# Patient Record
Sex: MALE | Race: BLACK OR AFRICAN AMERICAN | Employment: OTHER | URBAN - METROPOLITAN AREA
[De-identification: names, ages, dates, MRNs, and addresses within clinical notes are randomized per-mention and may not be internally consistent; named-entity substitution may affect disease eponyms.]

---

## 2017-03-22 ENCOUNTER — OFFICE VISIT (OUTPATIENT)
Dept: PHARMACY | Facility: CLINIC | Age: 42
End: 2017-03-22

## 2017-03-22 ENCOUNTER — OFFICE VISIT (OUTPATIENT)
Dept: FAMILY MEDICINE | Facility: CLINIC | Age: 42
End: 2017-03-22

## 2017-03-22 VITALS
BODY MASS INDEX: 28.88 KG/M2 | WEIGHT: 163 LBS | HEART RATE: 85 BPM | OXYGEN SATURATION: 97 % | HEIGHT: 63 IN | TEMPERATURE: 97.5 F | DIASTOLIC BLOOD PRESSURE: 71 MMHG | SYSTOLIC BLOOD PRESSURE: 102 MMHG | RESPIRATION RATE: 20 BRPM

## 2017-03-22 DIAGNOSIS — F19.11 HISTORY OF SUBSTANCE ABUSE (H): ICD-10-CM

## 2017-03-22 DIAGNOSIS — F33.9 RECURRENT MAJOR DEPRESSIVE DISORDER, REMISSION STATUS UNSPECIFIED (H): ICD-10-CM

## 2017-03-22 DIAGNOSIS — F41.9 ANXIETY: ICD-10-CM

## 2017-03-22 DIAGNOSIS — F17.210 CIGARETTE NICOTINE DEPENDENCE WITHOUT COMPLICATION: ICD-10-CM

## 2017-03-22 DIAGNOSIS — Z76.89 ENCOUNTER TO ESTABLISH CARE: Primary | ICD-10-CM

## 2017-03-22 DIAGNOSIS — K04.7 DENTAL INFECTION: ICD-10-CM

## 2017-03-22 DIAGNOSIS — F33.9 RECURRENT MAJOR DEPRESSIVE DISORDER, REMISSION STATUS UNSPECIFIED (H): Primary | ICD-10-CM

## 2017-03-22 RX ORDER — QUETIAPINE FUMARATE 400 MG/1
400 TABLET, FILM COATED ORAL
COMMUNITY
Start: 2017-03-14 | End: 2018-10-24

## 2017-03-22 RX ORDER — PROPRANOLOL HYDROCHLORIDE 80 MG/1
80 CAPSULE, EXTENDED RELEASE ORAL
COMMUNITY
Start: 2017-03-14 | End: 2018-08-21

## 2017-03-22 RX ORDER — ATOMOXETINE 100 MG/1
100 CAPSULE ORAL
COMMUNITY
Start: 2017-03-14 | End: 2017-04-20

## 2017-03-22 RX ORDER — TAMSULOSIN HYDROCHLORIDE 0.4 MG/1
0.4 CAPSULE ORAL
COMMUNITY
Start: 2017-03-14 | End: 2017-04-20

## 2017-03-22 RX ORDER — DIPHENHYDRAMINE HCL 25 MG
25-50 TABLET ORAL
COMMUNITY
Start: 2017-03-14 | End: 2017-04-14

## 2017-03-22 RX ORDER — QUETIAPINE FUMARATE 100 MG/1
100 TABLET, FILM COATED ORAL 2 TIMES DAILY
COMMUNITY
Start: 2017-03-14 | End: 2018-08-21

## 2017-03-22 RX ORDER — CLINDAMYCIN HCL 300 MG
300 CAPSULE ORAL 3 TIMES DAILY
Qty: 30 CAPSULE | Refills: 0 | Status: SHIPPED | OUTPATIENT
Start: 2017-03-22 | End: 2017-04-01

## 2017-03-22 RX ORDER — ALBUTEROL SULFATE 90 UG/1
1-2 AEROSOL, METERED RESPIRATORY (INHALATION)
COMMUNITY
Start: 2017-03-14 | End: 2017-04-14

## 2017-03-22 RX ORDER — PROPRANOLOL HYDROCHLORIDE 20 MG/1
20 TABLET ORAL
COMMUNITY
Start: 2017-03-14 | End: 2018-09-11

## 2017-03-22 RX ORDER — ATOMOXETINE 60 MG/1
100 CAPSULE ORAL
COMMUNITY
Start: 2016-05-14 | End: 2017-03-22

## 2017-03-22 RX ORDER — VITAMIN A ACETATE, BETA CAROTENE, ASCORBIC ACID, CHOLECALCIFEROL, .ALPHA.-TOCOPHEROL ACETATE, DL-, THIAMINE MONONITRATE, RIBOFLAVIN, NIACINAMIDE, PYRIDOXINE HYDROCHLORIDE, FOLIC ACID, CYANOCOBALAMIN, CALCIUM CARBONATE, FERROUS FUMARATE, ZINC OXIDE, CUPRIC OXIDE 3080; 12; 120; 400; 1; 1.84; 3; 20; 22; 920; 25; 200; 27; 10; 2 [IU]/1; UG/1; MG/1; [IU]/1; MG/1; MG/1; MG/1; MG/1; MG/1; [IU]/1; MG/1; MG/1; MG/1; MG/1; MG/1
TABLET, FILM COATED ORAL
COMMUNITY
Start: 2017-03-14 | End: 2017-05-19

## 2017-03-22 ASSESSMENT — ENCOUNTER SYMPTOMS
RESPIRATORY NEGATIVE: 1
DYSPHORIC MOOD: 1
CARDIOVASCULAR NEGATIVE: 1
NERVOUS/ANXIOUS: 1
CONSTITUTIONAL NEGATIVE: 1

## 2017-03-22 ASSESSMENT — ANXIETY QUESTIONNAIRES
7. FEELING AFRAID AS IF SOMETHING AWFUL MIGHT HAPPEN: SEVERAL DAYS
2. NOT BEING ABLE TO STOP OR CONTROL WORRYING: SEVERAL DAYS
3. WORRYING TOO MUCH ABOUT DIFFERENT THINGS: NEARLY EVERY DAY
5. BEING SO RESTLESS THAT IT IS HARD TO SIT STILL: NEARLY EVERY DAY
1. FEELING NERVOUS, ANXIOUS, OR ON EDGE: NEARLY EVERY DAY
IF YOU CHECKED OFF ANY PROBLEMS ON THIS QUESTIONNAIRE, HOW DIFFICULT HAVE THESE PROBLEMS MADE IT FOR YOU TO DO YOUR WORK, TAKE CARE OF THINGS AT HOME, OR GET ALONG WITH OTHER PEOPLE: VERY DIFFICULT

## 2017-03-22 ASSESSMENT — PATIENT HEALTH QUESTIONNAIRE - PHQ9: 5. POOR APPETITE OR OVEREATING: MORE THAN HALF THE DAYS

## 2017-03-22 NOTE — PROGRESS NOTES
Pharmacy Progress Note: Transitions of Care     History of Hospitalization     Paul Godfrey was referred by Eva Henley CNP    for transitional care and medication management following their recent hospitalization.       HISTORY OF Hospitalization    Reason for hospitalization: Muscle cramping and dehyration   Date of ADMIT:    Date of DISCHARGE ~3/13/17   Other hospitalizations in past year:      Hospital problem list/ specific issues to address:  1.     Suicidal ideation   2.     Drug induced Rhabdomyolysis       SUBJECTIVE     Patient is in clinic today for hospital fu and establish care with Eva Henley CNP     Depakote- want the clinic to know that he had a bad reaction to Depakote and will refuse this medication if it is offered.    Aspirin allergy  Has been on the current med list for the past 3 weeks.    Would like to restart old meds. Concerned about anxiety.  Wants to start old medications.       Propranolol 20 mg - has not been working for anxiety.   Left hospital: and was dc'ed meds  zoloft  wellbutrin  trazadone  seroquel    Muscle cramps were happening before the hospitalization.    Does not have a psychiatrist currently.    Need a [facility]. - ángel LOPEZ  Psychiatrist in [facility].        ASTHMA:  Needs sports induced asthma  Controlled with ventolin       OBJECTIVE  Creatinine   Date Value Ref Range Status   08/13/2013 1.15 0.80 - 1.30 mg/dL Final   ]    Liver Function Studies -   Recent Labs   Lab Test  08/13/13   1203   PROTTOTAL  7.9   ALBUMIN  4.0   BILITOTAL  0.2   ALKPHOS  110   AST  25   ALT  39       ASSESSMENT     Drug Therapy Problems  Patient s recent hospitalization was likely/not likely related to medication use.      1)  Medication list  ;     DTP degree:1               Time today was spent discussing medications that he was formerly on and those that he would like to restart.  At this time we have updated the medication list as we can seen in  CAREeverywhere.       Plan is to establish mental health care and then consider changing therapy at that time.        Health literacy           General: Low/Moderate/High/Unknown  Comment: moderate     PLAN     Outpatient medication list has been updated to reflects patient s current medication use.       1. Follow up with Pyschiatry           Follow up by:  In clinic     MTM and medication reconciliation have been completed by pharmacy. Medication assessment and plan address patient s specific concerns and goals.   Eva Henley CNP  was provided our recommendations in clinic today  before/after they saw patient and  was available for supervision during the visit and is the authorizing prescriber for this visit through the pharmacist collaborative practice agreement.     Thank you for the opportunity to participate in the care of this patient.  Ian Cox, Pharm.D.  Appointment length: 20 min

## 2017-03-22 NOTE — MR AVS SNAPSHOT
After Visit Summary   3/22/2017    Paul Godfrey    MRN: [id number]           Patient Information     Date Of Birth          1975        Visit Information        Provider Department      3/22/2017 9:00 AM [facility]        Today's Diagnoses     Recurrent major depressive disorder, remission status unspecified (H)    -  1       Follow-ups after your visit        Who to contact     Please call your clinic at [phone] to:    Ask questions about your health    Make or cancel appointments    Discuss your medicines    Learn about your test results    Speak to your doctor   If you have compliments or concerns about an experience at your clinic, or if you wish to file a complaint, please contact [facility] Patient Relations at [phone] or email us at [email or web address]         Additional Information About Your Visit        Care EveryWhere ID     This is your Care EveryWhere ID. This could be used by other organizations to access your [facility] medical records  [id number]         Blood Pressure from Last 3 Encounters:   03/22/17 102/71   08/13/13 112/76    Weight from Last 3 Encounters:   03/22/17 163 lb (73.9 kg)   08/13/13 143 lb (64.9 kg)              Today, you had the following     No orders found for display         Today's Medication Changes          These changes are accurate as of: 3/22/17 11:59 PM.  If you have any questions, ask your nurse or doctor.               Start taking these medicines.        Dose/Directions    clindamycin 300 MG capsule   Commonly known as:  CLEOCIN   Used for:  Dental infection   Started by:  Eva Henley APRN CNP        Dose:  300 mg   Take 1 capsule (300 mg) by mouth 3 times daily for 10 days   Quantity:  30 capsule   Refills:  0         These medicines have changed or have updated prescriptions.        Dose/Directions    atomoxetine 100 MG capsule   Commonly known  as:  STRATTERA   This may have changed:  Another medication with the same name was removed. Continue taking this medication, and follow the directions you see here.   Changed by:  Eva Henley APRN CNP        Dose:  100 mg   Take 100 mg by mouth   Refills:  0         Stop taking these medicines if you haven't already. Please contact your care team if you have questions.     ADDERALL 30 MG per tablet   Generic drug:  amphetamine-dextroamphetamine   Stopped by:  Eva Henley APRN CNP           LORazepam 2 MG tablet   Commonly known as:  ATIVAN   Stopped by:  Eva Henley APRN CNP           olopatadine 0.1 % ophthalmic solution   Commonly known as:  PATANOL   Stopped by:  Eva Henley APRN CNP           REMERON PO   Stopped by:  Eva Henley APRN CNP           traZODone 50 MG tablet   Commonly known as:  DESYREL   Stopped by:  Eva Henley APRN CNP           ZOLOFT PO   Stopped by:  Eva Henley APRN CNP                Where to get your medicines      These medications were sent to [facility] [place], [place] - [address] [facility]  [address] [place] [address]     Phone:  [phone]     clindamycin 300 MG capsule                Primary Care Provider Office Phone # Fax #    TARAH Donaldson [phone]       [facility] [address] [facility] [address]        Thank you!     Thank you for choosing [facility]  for your care. Our goal is always to provide you with excellent care. Hearing back from our patients is one way we can continue to improve our services. Please take a few minutes to complete the written survey that you may receive in the mail after your visit with us. Thank you!             Your Updated Medication List - Protect others around you: Learn how to safely use, store and throw away your medicines at www.disposemymeds.org.          This list is accurate as of: 3/22/17  11:59 PM.  Always use your most recent med list.                   Brand Name Dispense Instructions for use    acetaminophen 325 MG Suppository    TYLENOL     Place 325 mg rectally every 4 hours as needed for mild pain       albuterol 108 (90 BASE) MCG/ACT Inhaler    PROAIR HFA/PROVENTIL HFA/VENTOLIN HFA     Inhale 1-2 puffs into the lungs       atomoxetine 100 MG capsule    STRATTERA     Take 100 mg by mouth       clindamycin 300 MG capsule    CLEOCIN    30 capsule    Take 1 capsule (300 mg) by mouth 3 times daily for 10 days       diphenhydrAMINE 25 MG tablet    BENADRYL     Take 25-50 mg by mouth       PRENATAL PLUS 27-1 MG Tabs          * propranolol 20 MG tablet    INDERAL     Take 20 mg by mouth       * propranolol 80 MG 24 hr capsule    INDERAL LA     Take 80 mg by mouth       * QUEtiapine 100 MG tablet    SEROquel     Take 100 mg by mouth       * QUEtiapine 400 MG tablet    SEROquel     Take 400 mg by mouth       tamsulosin 0.4 MG capsule    FLOMAX     Take 0.4 mg by mouth       * Notice:  This list has 4 medication(s) that are the same as other medications prescribed for you. Read the directions carefully, and ask your doctor or other care provider to review them with you.

## 2017-03-22 NOTE — MR AVS SNAPSHOT
After Visit Summary   3/22/2017    Paul Godfrey    MRN: [id number]           Patient Information     Date Of Birth          1975        Visit Information        Provider Department      3/22/2017 9:20 AM Eva Henley APRN CNP [facility]        Today's Diagnoses     Anxiety    -  1    Recurrent major depressive disorder, remission status unspecified (H)        Dental infection          Care Instructions    [facility]   Address: [address], [place] [address]  Phone: [phone]        [facility]  Address: [address], [place] [address]  Phone: [phone]      Here is the plan from today's visit    1. Anxiety    - BEHAVIORAL HEALTH REFERRAL ([facility] interal and external)  - MENTAL HEALTH REFERRAL    2. Recurrent major depressive disorder, remission status unspecified (H)    - MENTAL HEALTH REFERRAL    3. Dental infection    - clindamycin (CLEOCIN) 300 MG capsule; Take 1 capsule (300 mg) by mouth 3 times daily for 10 days  Dispense: 30 capsule; Refill: 0    Follow-up in clinic after psychiatry appointment, sooner as needed.       Thank you for coming to [facility] today.  Lab Testing:  **If you had lab testing today and your results are reassuring or normal they will be mailed to you or sent through [employer] within 7 days.   **If the lab tests need quick action we will call you with the results.  The phone number we will call with results is # [phone] (home) . If this is not the best number please call our clinic and change the number.  Medication Refills:  If you need any refills please call your pharmacy and they will contact us.   If you need to  your refill at a new pharmacy, please contact the new pharmacy directly. The new pharmacy will help you get your medications transferred faster.   Scheduling:  If you have any concerns about today's visit or wish to  schedule another appointment please call our office during normal business hours [phone] (8-5:00 M-F)  If a referral was made to a [facility] and you don't get a call from central scheduling please call [phone].  If a Mammogram was ordered for you at [facility] call [phone] to schedule or change your appointment.  If you had an XRay/CT/Ultrasound/MRI ordered the number is [phone] to schedule or change your radiology appointment.   Medical Concerns:  If you have urgent medical concerns please call [phone] at any time of the day.  If you have a medical emergency please call [phone].          Follow-ups after your visit        Additional Services     BEHAVIORAL HEALTH REFERRAL ([facility] inter[facility] and external)       Referring MD: THU HOUSTON    May leave message on voicemail: Yes  PHQ-9 Score: 12  GAD7 Score: 16            MENTAL HEALTH REFERRAL       Your provider has referred you to:     [facility]  Psychiatry evaluation and further management    All scheduling is subject to the client's specific insurance plan & benefits, provider/location availability, and provider clinical specialities.  Please arrive 15 minutes early for your first appointment and bring your completed paperwork.    Please be aware that coverage of these services is subject to the terms and limitations of your health insurance plan.  Call member services at your health plan with any benefit or coverage questions.                  Who to contact     Please call your clinic at [phone] to:    Ask questions about your health    Make or cancel appointments    Discuss your medicines    Learn about your test results    Speak to your doctor   If you have compliments or concerns about an experience at your clinic, or if you wish to file a complaint, please contact [facility] Patient Relations at [phone] or email us at [email or web address]         Additional  "Information About Your Visit        Care EveryWhere ID     This is your Care EveryWhere ID. This could be used by other organizations to access your [facility] medical records  [id number]        Your Vitals Were     Pulse Temperature Respirations Height Pulse Oximetry BMI (Body Mass Index)    85 97.5  F (36.4  C) (Oral) 20 5' 3\" (160 cm) 97% 28.87 kg/m2       Blood Pressure from Last 3 Encounters:   03/22/17 102/71   08/13/13 112/76    Weight from Last 3 Encounters:   03/22/17 163 lb (73.9 kg)   08/13/13 143 lb (64.9 kg)              We Performed the Following     BEHAVIORAL HEALTH REFERRAL ([facility] interal and external)     MENTAL HEALTH REFERRAL          Today's Medication Changes          These changes are accurate as of: 3/22/17 10:30 AM.  If you have any questions, ask your nurse or doctor.               Start taking these medicines.        Dose/Directions    clindamycin 300 MG capsule   Commonly known as:  CLEOCIN   Used for:  Dental infection   Started by:  Eva Henley APRN CNP        Dose:  300 mg   Take 1 capsule (300 mg) by mouth 3 times daily for 10 days   Quantity:  30 capsule   Refills:  0         These medicines have changed or have updated prescriptions.        Dose/Directions    atomoxetine 100 MG capsule   Commonly known as:  STRATTERA   This may have changed:  Another medication with the same name was removed. Continue taking this medication, and follow the directions you see here.   Changed by:  Eva Henley APRN CNP        Dose:  100 mg   Take 100 mg by mouth   Refills:  0         Stop taking these medicines if you haven't already. Please contact your care team if you have questions.     ADDERALL 30 MG per tablet   Generic drug:  amphetamine-dextroamphetamine   Stopped by:  Eva Henley APRN CNP           LORazepam 2 MG tablet   Commonly known as:  ATIVAN   Stopped by:  Eva Henley APRN CNP           olopatadine 0.1 % ophthalmic solution   Commonly known as: "  PATANOL   Stopped by:  Eva Henley APRN CNP           REMERON PO   Stopped by:  Eva Henley APRN CNP           traZODone 50 MG tablet   Commonly known as:  DESYREL   Stopped by:  Eva Henley APRN CNP           ZOLOFT PO   Stopped by:  Eva Henley APRN CNP                Where to get your medicines      These medications were sent to [facility] [place], [place] - [address] [facility] [address]     Phone:  [phone]     clindamycin 300 MG capsule                Primary Care Provider    Provider Unknown       No address on file        Thank you!     Thank you for choosing [facility]  for your care. Our goal is always to provide you with excellent care. Hearing back from our patients is one way we can continue to improve our services. Please take a few minutes to complete the written survey that you may receive in the mail after your visit with us. Thank you!             Your Updated Medication List - Protect others around you: Learn how to safely use, store and throw away your medicines at www.disposemymeds.org.          This list is accurate as of: 3/22/17 10:30 AM.  Always use your most recent med list.                   Brand Name Dispense Instructions for use    acetaminophen 325 MG Suppository    TYLENOL     Place 325 mg rectally every 4 hours as needed for mild pain       albuterol 108 (90 BASE) MCG/ACT Inhaler    PROAIR HFA/PROVENTIL HFA/VENTOLIN HFA     Inhale 1-2 puffs into the lungs       atomoxetine 100 MG capsule    STRATTERA     Take 100 mg by mouth       clindamycin 300 MG capsule    CLEOCIN    30 capsule    Take 1 capsule (300 mg) by mouth 3 times daily for 10 days       diphenhydrAMINE 25 MG tablet    BENADRYL     Take 25-50 mg by mouth       PRENATAL PLUS 27-1 MG Tabs          * propranolol 20 MG tablet    INDERAL     Take 20 mg by mouth       * propranolol 80 MG 24 hr capsule    INDERAL LA     Take  80 mg by mouth       * QUEtiapine 100 MG tablet    SEROquel     Take 100 mg by mouth       * QUEtiapine 400 MG tablet    SEROquel     Take 400 mg by mouth       tamsulosin 0.4 MG capsule    FLOMAX     Take 0.4 mg by mouth       * Notice:  This list has 4 medication(s) that are the same as other medications prescribed for you. Read the directions carefully, and ask your doctor or other care provider to review them with you.

## 2017-03-22 NOTE — PATIENT INSTRUCTIONS
[facility]   Address: [address], [place] [address]  Phone: [phone]        [facility]  Address: [address], [place] [address]  Phone: [phone]      Here is the plan from today's visit    1. Anxiety    - BEHAVIORAL HEALTH REFERRAL ([facility] interal and external)  - MENTAL HEALTH REFERRAL    2. Recurrent major depressive disorder, remission status unspecified (H)    - MENTAL HEALTH REFERRAL    3. Dental infection    - clindamycin (CLEOCIN) 300 MG capsule; Take 1 capsule (300 mg) by mouth 3 times daily for 10 days  Dispense: 30 capsule; Refill: 0    Follow-up in clinic after psychiatry appointment, sooner as needed.       Thank you for coming to [facility] today.  Lab Testing:  **If you had lab testing today and your results are reassuring or normal they will be mailed to you or sent through [email or web address] within 7 days.   **If the lab tests need quick action we will call you with the results.  The phone number we will call with results is # [phone] (home) . If this is not the best number please call our clinic and change the number.  Medication Refills:  If you need any refills please call your pharmacy and they will contact us.   If you need to  your refill at a new pharmacy, please contact the new pharmacy directly. The new pharmacy will help you get your medications transferred faster.   Scheduling:  If you have any concerns about today's visit or wish to schedule another appointment please call our office during normal business hours [phone] (8-5:00 M-F)  If a referral was made to a [facility] and you don't get a call from central scheduling please call [phone].  If a Mammogram was ordered for you at [facility] call [phone] to schedule or change your appointment.  If you had an XRay/CT/Ultrasound/MRI ordered the number is [phone] to schedule or change your  radiology appointment.   Medical Concerns:  If you have urgent medical concerns please call [phone] at any time of the day.  If you have a medical emergency please call [phone].

## 2017-03-22 NOTE — PROGRESS NOTES
HPI:       Paul Godfrey is a 41 year old who presents for the following  Patient presents with:  Hospital F/U: Discharged due to bilateral leg spasm signed CE for [facility], [facility] and [facility]  Referral: Mental Health, and dentist for teeth issues  Establish Care  Other: Boil near upper pelvic area noticed x1 week - painful and would like removed today if possible.     Patient reports being hospitalized for muscle cramps, dehydration on 2/17/17 and then was transferred to behavioral unit at [facility] in [place].  Was discharged from there on 3/13/17.  Is currently at [facility], this allowed him to get away from previous unhealthy relationship.  Was accepted into training program.      Care everywhere was obtained.   Upon review of chart.  Patient was hospitalized for suicidal ideation, IDU with methamphetamine, chronic shoulder pain with concern form NMS vs. Drug induced disorder. +rhabdomyolysis from meth use, hypothyroidism - needed outpatient follow-up.  UDS was positive for meth and THC.      Had a subsequent inpatient psychiatry stay at [facility].          Previous psychiatrist was in [place], was seeing via skype - Dr. Amalia Abdi. This referral was done by [facility].      Needs dental referral.    Dental concerns - nerve exposure.    Left lower molars are painful.    Last dental visit was a couple years ago.   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     Previously was living in [place].  Is originally from [place].    , has 5 children.       Past medical history:  MDD, Anxiety, ADHD, social anxiety.     Alcohol:  None  Denies recent illicit drug use.   Tobacco:  3 cigarettes daily      Problem, Medication and Allergy Lists were reviewed and are current.  Patient is an established patient of this clinic.         Review of Systems:   Review of Systems   Constitutional: Negative.    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    Respiratory: Negative.         History of exercise induced asthma     Cardiovascular: Negative.    Genitourinary: Positive for urgency (from Flomax).   Psychiatric/Behavioral: Positive for dysphoric mood. Negative for suicidal ideas. The patient is nervous/anxious.         See HPI             Physical Exam:   Patient Vitals for the past 24 hrs:   BP Temp Temp src Pulse Resp SpO2 Height Weight   03/22/17 0933 102/71 97.5  F (36.4  C) Oral 85 20 97 % 5' 3\" (160 cm) 163 lb (73.9 kg)     Body mass index is 28.87 kg/(m^2).  Vitals were reviewed and were normal       Physical Exam   Constitutional: He is oriented to person, place, and time. He appears well-developed and well-nourished. No distress.   HENT:   Mouth/Throat:       Cardiovascular: Normal rate and regular rhythm.    Pulmonary/Chest: Effort normal and breath sounds normal. No respiratory distress. He has no wheezes.   Neurological: He is alert and oriented to person, place, and time.   Psychiatric: His behavior is normal. Judgment and thought content normal. His mood appears anxious. Cognition and memory are normal.   Speech is slightly pressured       Assessment and Plan     Paul was seen today for hospital f/u, referral and establish care.    Diagnoses and all orders for this visit:    Encounter to establish care  Care everywhere obtained.     Anxiety  -     BEHAVIORAL HEALTH REFERRAL ([facility] interal and external)  -     MENTAL HEALTH REFERRAL - psychiatry referral to [facility] - patient given phone number and address    Recurrent major depressive disorder, remission status unspecified (H)  -     MENTAL HEALTH REFERRAL    Need DHEERAJ from previous psychiatrist. Obtain at follow-up visit.     Dental infection  -     clindamycin (CLEOCIN) 300 MG capsule; Take 1 capsule (300 mg) by mouth 3 times daily for 10 days  -      Patient given phone number and address for [facility].      History of " substance abuse  Briefly discussed sobriety at today's visit.    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     Cigarette nicotine dependence without complication      Follow-up in clinic within one month - plan recheck of TSH at follow-up visit due to hypothyroidism during last hospitalization.   Encouraged CPE/further preventative care to be addressed.          Options for treatment and follow-up care were reviewed with the patient. Paul Godfrey  engaged in the decision making process and verbalized understanding of the options discussed and agreed with the final plan.    Eva Henley, TARAH CNP

## 2017-03-23 ENCOUNTER — TELEPHONE (OUTPATIENT)
Dept: FAMILY MEDICINE | Facility: CLINIC | Age: 42
End: 2017-03-23

## 2017-03-23 ASSESSMENT — PATIENT HEALTH QUESTIONNAIRE - PHQ9: SUM OF ALL RESPONSES TO PHQ QUESTIONS 1-9: 12

## 2017-03-23 NOTE — LETTER
Dear Paul Godfrey    You were recently in to see a physician here at [facility]. At that time, your doctor put in a referral for mental health counseling.  I have tried to reach you with information on possible places for you to receive these services, but I have been unable to reach you.  For that reason, I am sending this letter with some possible places listed below.  We provide you with more than one possibility, so that you can contact them to identify who might best meet your needs in terms of location and hours available for services.  You are always able to call your insurance company to identify locations that would work for you as well.  Please feel free to call me at the phone number below if you have any questions.    Sincerely,      [facility]      Mental Health Referral  Please Refer out to:     [facility] (adults)  [address] [place], [place]   [phone]  (other locations available, can call main #)

## 2017-03-23 NOTE — TELEPHONE ENCOUNTER
Patient was referred to [facility] for psychiatry, would suggest that he establish with a therapist there as well to help consolidate and coordinate treatment.    Mental Health Referral  Please Refer out to:    [facility] (adults)  [address] [place], [place]    [phone]  (other locations available, can call main #)

## 2017-04-10 ENCOUNTER — TELEPHONE (OUTPATIENT)
Dept: FAMILY MEDICINE | Facility: CLINIC | Age: 42
End: 2017-04-10

## 2017-04-10 DIAGNOSIS — J30.2 SEASONAL ALLERGIC RHINITIS, UNSPECIFIED ALLERGIC RHINITIS TRIGGER: Primary | ICD-10-CM

## 2017-04-10 RX ORDER — LORATADINE 10 MG/1
10 TABLET ORAL DAILY
Qty: 30 TABLET | Refills: 11 | Status: SHIPPED | OUTPATIENT
Start: 2017-04-10 | End: 2017-04-20

## 2017-04-10 NOTE — TELEPHONE ENCOUNTER
Prescription sent to patient's pharmacy for Loratadine, 10 mg once daily.  Patient to follow-up in clinic with no improvement or worsening of symptoms. - FREDDYeiluana, CNP

## 2017-04-10 NOTE — TELEPHONE ENCOUNTER
[facility] phone call message-patient reporting a symptom:     Symptom: Sneezing, runny nose, itchy, watery, and red eyes. Patient is requesting a prescription for loratadine to be sent to [facility] [phone].    Same Day Visit Offered: None available at time of call.    Additional comments:     OK to leave message on voice mail? Yes    Primary language: English      needed? No    Call taken on April 10, 2017 at 12:29 PM by Tommy Holly

## 2017-04-14 DIAGNOSIS — J45.20 INTERMITTENT ASTHMA, UNCOMPLICATED: Primary | ICD-10-CM

## 2017-04-14 RX ORDER — PROPRANOLOL HYDROCHLORIDE 80 MG/1
80 CAPSULE, EXTENDED RELEASE ORAL
Qty: 30 CAPSULE | Status: CANCELLED | OUTPATIENT
Start: 2017-04-14

## 2017-04-14 RX ORDER — DIPHENHYDRAMINE HCL 25 MG
25-50 TABLET ORAL EVERY 6 HOURS PRN
Qty: 56 TABLET | Refills: 1 | Status: SHIPPED | OUTPATIENT
Start: 2017-04-14 | End: 2018-09-11

## 2017-04-14 RX ORDER — TRAZODONE HYDROCHLORIDE 50 MG/1
TABLET, FILM COATED ORAL AT BEDTIME
Qty: 90 TABLET | Status: CANCELLED | OUTPATIENT
Start: 2017-04-14

## 2017-04-14 RX ORDER — VITAMIN A ACETATE, BETA CAROTENE, ASCORBIC ACID, CHOLECALCIFEROL, .ALPHA.-TOCOPHEROL ACETATE, DL-, THIAMINE MONONITRATE, RIBOFLAVIN, NIACINAMIDE, PYRIDOXINE HYDROCHLORIDE, FOLIC ACID, CYANOCOBALAMIN, CALCIUM CARBONATE, FERROUS FUMARATE, ZINC OXIDE, CUPRIC OXIDE 3080; 12; 120; 400; 1; 1.84; 3; 20; 22; 920; 25; 200; 27; 10; 2 [IU]/1; UG/1; MG/1; [IU]/1; MG/1; MG/1; MG/1; MG/1; MG/1; [IU]/1; MG/1; MG/1; MG/1; MG/1; MG/1
TABLET, FILM COATED ORAL
Qty: 30 TABLET | Status: CANCELLED | OUTPATIENT
Start: 2017-04-14

## 2017-04-14 RX ORDER — TAMSULOSIN HYDROCHLORIDE 0.4 MG/1
0.4 CAPSULE ORAL
Qty: 60 CAPSULE | Status: CANCELLED | OUTPATIENT
Start: 2017-04-14

## 2017-04-14 RX ORDER — ALBUTEROL SULFATE 90 UG/1
2 AEROSOL, METERED RESPIRATORY (INHALATION) EVERY 4 HOURS PRN
Qty: 1 INHALER | Refills: 1 | Status: SHIPPED | OUTPATIENT
Start: 2017-04-14 | End: 2018-09-11

## 2017-04-14 NOTE — TELEPHONE ENCOUNTER
Date of last visit at clinic: 3/22/17    Please complete refill and CLOSE ENCOUNTER.  Closing the encounter signifies the refill is complete.    Sarah Frank RN

## 2017-04-14 NOTE — TELEPHONE ENCOUNTER
Message routed to . Please schedule appointment in clinic for rest of refills per PCP.    Sarah Frank RN

## 2017-04-14 NOTE — TELEPHONE ENCOUNTER
Called patient to schedule appointment. Mobile number no longer in service. Called home number; no answer, left voicemail.

## 2017-04-14 NOTE — TELEPHONE ENCOUNTER
Patient was a new patient in March 2017 with recommendations for follow-up in 1 months.  Prescription refills given for Albuterol and Benadryl.  Patient needs clinic visit to address other refills.  - Katerina CNP

## 2017-04-14 NOTE — TELEPHONE ENCOUNTER
[facility] phone call message- patient requesting a refill:    Full Medication Name: Inderol 80mg 1capsule daily, Prenatal vitamin 27mg 1x daily, Tamulosin 0.4 mg cap 1xa day, ventolin 90mcg, Benadryl 25mg 1x daily, Trazadone 50mg 1-2 tabs at bedtime.    Dose: see above and in chart    Pharmacy confirmed as   [facility] [place] [address] [place] [address]  Phone: [phone] Fax: [phone]  : Yes    Additional Comments: Please refill today if possible he is out of medications.      OK to leave a message on voice mail? Yes    Primary language: English      needed? No    Call taken on April 14, 2017 at 9:02 AM by Kylee Torre

## 2017-04-20 ENCOUNTER — OFFICE VISIT (OUTPATIENT)
Dept: FAMILY MEDICINE | Facility: CLINIC | Age: 42
End: 2017-04-20

## 2017-04-20 VITALS
RESPIRATION RATE: 16 BRPM | OXYGEN SATURATION: 95 % | DIASTOLIC BLOOD PRESSURE: 74 MMHG | SYSTOLIC BLOOD PRESSURE: 112 MMHG | BODY MASS INDEX: 30.65 KG/M2 | TEMPERATURE: 98.1 F | HEART RATE: 92 BPM | WEIGHT: 173 LBS

## 2017-04-20 DIAGNOSIS — J30.2 SEASONAL ALLERGIC RHINITIS, UNSPECIFIED ALLERGIC RHINITIS TRIGGER: Primary | ICD-10-CM

## 2017-04-20 DIAGNOSIS — H10.13 ALLERGIC CONJUNCTIVITIS, BILATERAL: ICD-10-CM

## 2017-04-20 DIAGNOSIS — Z86.39 HISTORY OF HYPOTHYROIDISM: ICD-10-CM

## 2017-04-20 LAB — TSH SERPL DL<=0.005 MIU/L-ACNC: 2.51 MU/L (ref 0.4–4)

## 2017-04-20 RX ORDER — LISDEXAMFETAMINE DIMESYLATE 20 MG/1
20 CAPSULE ORAL EVERY MORNING
Qty: 30 CAPSULE | Refills: 0 | COMMUNITY
Start: 2017-04-20 | End: 2018-08-21

## 2017-04-20 RX ORDER — LORATADINE 10 MG/1
10 TABLET ORAL DAILY
Qty: 30 TABLET | Refills: 11 | Status: SHIPPED | OUTPATIENT
Start: 2017-04-20 | End: 2018-09-11

## 2017-04-20 RX ORDER — FLUTICASONE PROPIONATE 50 MCG
2 SPRAY, SUSPENSION (ML) NASAL DAILY
Qty: 1 BOTTLE | Refills: 11 | Status: SHIPPED | OUTPATIENT
Start: 2017-04-20 | End: 2018-09-11

## 2017-04-20 ASSESSMENT — ANXIETY QUESTIONNAIRES
1. FEELING NERVOUS, ANXIOUS, OR ON EDGE: SEVERAL DAYS
6. BECOMING EASILY ANNOYED OR IRRITABLE: SEVERAL DAYS
GAD7 TOTAL SCORE: 6
IF YOU CHECKED OFF ANY PROBLEMS ON THIS QUESTIONNAIRE, HOW DIFFICULT HAVE THESE PROBLEMS MADE IT FOR YOU TO DO YOUR WORK, TAKE CARE OF THINGS AT HOME, OR GET ALONG WITH OTHER PEOPLE: SOMEWHAT DIFFICULT
3. WORRYING TOO MUCH ABOUT DIFFERENT THINGS: SEVERAL DAYS
7. FEELING AFRAID AS IF SOMETHING AWFUL MIGHT HAPPEN: NOT AT ALL
5. BEING SO RESTLESS THAT IT IS HARD TO SIT STILL: MORE THAN HALF THE DAYS
2. NOT BEING ABLE TO STOP OR CONTROL WORRYING: NOT AT ALL

## 2017-04-20 ASSESSMENT — PATIENT HEALTH QUESTIONNAIRE - PHQ9: 5. POOR APPETITE OR OVEREATING: SEVERAL DAYS

## 2017-04-20 ASSESSMENT — ENCOUNTER SYMPTOMS
EYE REDNESS: 1
RESPIRATORY NEGATIVE: 1
CONSTITUTIONAL NEGATIVE: 1
CARDIOVASCULAR NEGATIVE: 1
EYE ITCHING: 1

## 2017-04-20 NOTE — MR AVS SNAPSHOT
After Visit Summary   4/20/2017    Paul Godfrey    MRN: [id number]           Patient Information     Date Of Birth          1975        Visit Information        Provider Department      4/20/2017 9:20 AM Eva Henley APRN CNP [facility]        Today's Diagnoses     Attention deficit hyperactivity disorder (ADHD), unspecified ADHD type    -  1    Seasonal allergic rhinitis, unspecified allergic rhinitis trigger        Allergic conjunctivitis, bilateral        History of hypothyroidism          Care Instructions    Here is the plan from today's visit    1. Seasonal allergic rhinitis, unspecified allergic rhinitis trigger  - loratadine (CLARITIN) 10 MG tablet; Take 1 tablet (10 mg) by mouth daily  Dispense: 30 tablet; Refill: 11  - fluticasone (FLONASE) 50 MCG/ACT spray; Spray 2 sprays into both nostrils daily  Dispense: 1 Bottle; Refill: 11    2. Attention deficit hyperactivity disorder (ADHD), unspecified ADHD type  - lisdexamfetamine (VYVANSE) 20 MG capsule; Take 1 capsule (20 mg) by mouth every morning  Dispense: 30 capsule; Refill: 0  Updated medication list    3. Allergic conjunctivitis, bilateral  - ketotifen (ZADITOR) 0.025 % SOLN ophthalmic solution; Place 1 drop into both eyes every 12 hours  Dispense: 1 Bottle; Refill: 6    4. History of hypothyroidism  - TSH with free T4 reflex      Follow-up with no improvement or worsening of symptoms.     Thank you for coming to [facility] today.  Lab Testing:  **If you had lab testing today and your results are reassuring or normal they will be mailed to you or sent through [employer] within 7 days.   **If the lab tests need quick action we will call you with the results.  The phone number we will call with results is # [phone] (home) . If this is not the best number please call our clinic and change the number.  Medication Refills:  If you need any refills please call your pharmacy and they will contact us.    If you need to  your refill at a new pharmacy, please contact the new pharmacy directly. The new pharmacy will help you get your medications transferred faster.   Scheduling:  If you have any concerns about today's visit or wish to schedule another appointment please call our office during normal business hours [phone] (8-5:00 M-F)  If a referral was made to a [facility] and you don't get a call from central scheduling please call [phone].  If a Mammogram was ordered for you at [facility] call [phone] to schedule or change your appointment.  If you had an XRay/CT/Ultrasound/MRI ordered the number is [phone] to schedule or change your radiology appointment.   Medical Concerns:  If you have urgent medical concerns please call [phone] at any time of the day.  If you have a medical emergency please call 221.          Follow-ups after your visit        Who to contact     Please call your clinic at [phone] to:    Ask questions about your health    Make or cancel appointments    Discuss your medicines    Learn about your test results    Speak to your doctor   If you have compliments or concerns about an experience at your clinic, or if you wish to file a complaint, please contact [facility] Patient Relations at [phone] or email us at [email or web address]         Additional Information About Your Visit        Care EveryWhere ID     This is your Care EveryWhere ID. This could be used by other organizations to access your [facility] medical records  [id number]        Your Vitals Were     Pulse Temperature Respirations Pulse Oximetry BMI (Body Mass Index)       92 98.1  F (36.7  C) (Oral) 16 95% 30.65 kg/m2        Blood Pressure from Last 3 Encounters:   04/20/17 112/74   03/22/17 102/71   08/13/13 112/76    Weight from Last 3 Encounters:   04/20/17 173 lb (78.5 kg)   03/22/17 163 lb (73.9 kg)   08/13/13 143 lb  (64.9 kg)              We Performed the Following     TSH with free T4 reflex          Today's Medication Changes          These changes are accurate as of: 4/20/17  9:42 AM.  If you have any questions, ask your nurse or doctor.               Start taking these medicines.        Dose/Directions    fluticasone 50 MCG/ACT spray   Commonly known as:  FLONASE   Used for:  Seasonal allergic rhinitis, unspecified allergic rhinitis trigger   Started by:  Eva Henley APRN CNP        Dose:  2 spray   Spray 2 sprays into both nostrils daily   Quantity:  1 Bottle   Refills:  11       ketotifen 0.025 % Soln ophthalmic solution   Commonly known as:  ZADITOR   Used for:  Allergic conjunctivitis, bilateral   Started by:  Eva Henley APRN CNP        Dose:  1 drop   Place 1 drop into both eyes every 12 hours   Quantity:  1 Bottle   Refills:  6         Stop taking these medicines if you haven't already. Please contact your care team if you have questions.     atomoxetine 100 MG capsule   Commonly known as:  STRATTERA   Stopped by:  Eva Henley APRN CNP           tamsulosin 0.4 MG capsule   Commonly known as:  FLOMAX   Stopped by:  Eva Henley APRN CNP                Where to get your medicines      These medications were sent to [facility] [place] - [address], [place] [address]     Phone:  [phone]     fluticasone 50 MCG/ACT spray    ketotifen 0.025 % Soln ophthalmic solution    loratadine 10 MG tablet                Primary Care Provider Office Phone # Fax #    TARAH Donaldson [phone]       [facility] [address] [facility] [address]        Thank you!     Thank you for choosing [facility]  for your care. Our goal is always to provide you with excellent care. Hearing back from our patients is one way we can continue to improve our services. Please take a few minutes to  complete the written survey that you may receive in the mail after your visit with us. Thank you!             Your Updated Medication List - Protect others around you: Learn how to safely use, store and throw away your medicines at www.disposemymeds.org.          This list is accurate as of: 4/20/17  9:42 AM.  Always use your most recent med list.                   Brand Name Dispense Instructions for use    acetaminophen 325 MG Suppository    TYLENOL     Place 325 mg rectally every 4 hours as needed for mild pain Reported on 4/20/2017       albuterol 108 (90 BASE) MCG/ACT Inhaler    PROAIR HFA/PROVENTIL HFA/VENTOLIN HFA    1 Inhaler    Inhale 2 puffs into the lungs every 4 hours as needed for shortness of breath / dyspnea or wheezing       diphenhydrAMINE 25 MG tablet    BENADRYL    56 tablet    Take 1-2 tablets (25-50 mg) by mouth every 6 hours as needed for itching or allergies       fluticasone 50 MCG/ACT spray    FLONASE    1 Bottle    Spray 2 sprays into both nostrils daily       ketotifen 0.025 % Soln ophthalmic solution    ZADITOR    1 Bottle    Place 1 drop into both eyes every 12 hours       loratadine 10 MG tablet    CLARITIN    30 tablet    Take 1 tablet (10 mg) by mouth daily       PRENATAL PLUS 27-1 MG Tabs          * propranolol 20 MG tablet    INDERAL     Take 20 mg by mouth       * propranolol 80 MG 24 hr capsule    INDERAL LA     Take 80 mg by mouth       * QUEtiapine 100 MG tablet    SEROquel     Take 100 mg by mouth 2 times daily Takes 100 mg in the morning and 100 mg in the afternoon (5 p.m.)       * QUEtiapine 400 MG tablet    SEROquel     Take 400 mg by mouth       VYVANSE 20 MG capsule   Generic drug:  lisdexamfetamine     30 capsule    Take 1 capsule (20 mg) by mouth every morning       * Notice:  This list has 4 medication(s) that are the same as other medications prescribed for you. Read the directions carefully, and ask your doctor or other care provider to review them with you.

## 2017-04-20 NOTE — PROGRESS NOTES
HPI:       Paul Godfrey is a 42 year old who presents for the following  Patient presents with:  Refill Request: Flomax, allergy meds    1.  Bilateral eye itching and burning.  History of allergic conjunctivitis.    Take Claritin once daily.  Would like to try eye drops.        Treatment is going ok.  Sobriety is going well.  Is still at [facility].      Dr. Hickey - at [facility] is psychiatrist, was seen yesterday.        Problem, Medication and Allergy Lists were reviewed and are current.  Patient is an established patient of this clinic.         Review of Systems:   Review of Systems   Constitutional: Negative.    HENT: Positive for congestion and sneezing.    Eyes: Positive for redness, itching and visual disturbance.   Respiratory: Negative.    Cardiovascular: Negative.              Physical Exam:   Patient Vitals for the past 24 hrs:   BP Temp Temp src Pulse Resp SpO2 Weight   04/20/17 0913 112/74 98.1  F (36.7  C) Oral 92 16 95 % 173 lb (78.5 kg)     Body mass index is 30.65 kg/(m^2).  Vitals were reviewed and were normal     Physical Exam   Constitutional: He appears well-nourished. No distress.   HENT:   Right Ear: Tympanic membrane and ear canal normal.   Left Ear: Tympanic membrane and ear canal normal.   Nose: Mucosal edema and rhinorrhea present.   Mouth/Throat: Oropharynx is clear and moist.   Eyes: Pupils are equal, round, and reactive to light. Right eye exhibits no discharge. Left eye exhibits no discharge. Right conjunctiva is injected. Left conjunctiva is injected.   Cardiovascular: Normal rate and regular rhythm.    Pulmonary/Chest: Effort normal and breath sounds normal. No respiratory distress. He has no wheezes.       Assessment and Plan     Paul was seen today for refill request.    Diagnoses and all orders for this visit:    Seasonal allergic rhinitis, unspecified allergic rhinitis trigger  -     loratadine (CLARITIN) 10 MG tablet; Take 1 tablet (10 mg) by mouth daily  -      fluticasone (FLONASE) 50 MCG/ACT spray; Spray 2 sprays into both nostrils daily    Allergic conjunctivitis, bilateral  -     ketotifen (ZADITOR) 0.025 % SOLN ophthalmic solution; Place 1 drop into both eyes every 12 hours    History of hypothyroidism - abnormal TSH during last hospitalization  -     TSH with free T4 reflex    Follow-up with no improvement or worsening of symptoms.       Options for treatment and follow-up care were reviewed with the patient. Paul Godfrey  engaged in the decision making process and verbalized understanding of the options discussed and agreed with the final plan.    Eva Henley, APRN CNP

## 2017-04-20 NOTE — PATIENT INSTRUCTIONS
Here is the plan from today's visit    1. Seasonal allergic rhinitis, unspecified allergic rhinitis trigger  - loratadine (CLARITIN) 10 MG tablet; Take 1 tablet (10 mg) by mouth daily  Dispense: 30 tablet; Refill: 11  - fluticasone (FLONASE) 50 MCG/ACT spray; Spray 2 sprays into both nostrils daily  Dispense: 1 Bottle; Refill: 11    2. Attention deficit hyperactivity disorder (ADHD), unspecified ADHD type  - lisdexamfetamine (VYVANSE) 20 MG capsule; Take 1 capsule (20 mg) by mouth every morning  Dispense: 30 capsule; Refill: 0  Updated medication list    3. Allergic conjunctivitis, bilateral  - ketotifen (ZADITOR) 0.025 % SOLN ophthalmic solution; Place 1 drop into both eyes every 12 hours  Dispense: 1 Bottle; Refill: 6    4. History of hypothyroidism  - TSH with free T4 reflex      Follow-up with no improvement or worsening of symptoms.     Thank you for coming to [facility] today.  Lab Testing:  **If you had lab testing today and your results are reassuring or normal they will be mailed to you or sent through [employer] within 7 days.   **If the lab tests need quick action we will call you with the results.  The phone number we will call with results is # [phone] (home) . If this is not the best number please call our clinic and change the number.  Medication Refills:  If you need any refills please call your pharmacy and they will contact us.   If you need to  your refill at a new pharmacy, please contact the new pharmacy directly. The new pharmacy will help you get your medications transferred faster.   Scheduling:  If you have any concerns about today's visit or wish to schedule another appointment please call our office during normal business hours [phone] (8-5:00 M-F)  If a referral was made to a [facility] and you don't get a call from central scheduling please call [phone].  If a Mammogram was ordered for you at [facility] call [phone] to schedule  or change your appointment.  If you had an XRay/CT/Ultrasound/MRI ordered the number is [phone] to schedule or change your radiology appointment.   Medical Concerns:  If you have urgent medical concerns please call [phone] at any time of the day.  If you have a medical emergency please call [phone].

## 2017-04-20 NOTE — LETTER
April 20, 2017      Paul Godfrey  [address] [address]        Dear Paul,    Thank you for getting your care at [facility]. Please see below for your test results.    Your thyroid test was normal and reassuring.      Resulted Orders   TSH with free T4 reflex   Result Value Ref Range    TSH 2.51 0.40 - 4.00 mU/L       If you have any concerns about these results please call and leave a message for me or send a [employer] message to the clinic.    Sincerely,    TARAH Salazar CNP

## 2017-04-21 ASSESSMENT — PATIENT HEALTH QUESTIONNAIRE - PHQ9: SUM OF ALL RESPONSES TO PHQ QUESTIONS 1-9: 6

## 2017-04-21 ASSESSMENT — ANXIETY QUESTIONNAIRES: GAD7 TOTAL SCORE: 6

## 2017-05-02 ENCOUNTER — TELEPHONE (OUTPATIENT)
Dept: FAMILY MEDICINE | Facility: CLINIC | Age: 42
End: 2017-05-02

## 2017-05-03 RX ORDER — VITAMIN A ACETATE, BETA CAROTENE, ASCORBIC ACID, CHOLECALCIFEROL, .ALPHA.-TOCOPHEROL ACETATE, DL-, THIAMINE MONONITRATE, RIBOFLAVIN, NIACINAMIDE, PYRIDOXINE HYDROCHLORIDE, FOLIC ACID, CYANOCOBALAMIN, CALCIUM CARBONATE, FERROUS FUMARATE, ZINC OXIDE, CUPRIC OXIDE 3080; 12; 120; 400; 1; 1.84; 3; 20; 22; 920; 25; 200; 27; 10; 2 [IU]/1; UG/1; MG/1; [IU]/1; MG/1; MG/1; MG/1; MG/1; MG/1; [IU]/1; MG/1; MG/1; MG/1; MG/1; MG/1
TABLET, FILM COATED ORAL
Qty: 30 TABLET | Status: CANCELLED | OUTPATIENT
Start: 2017-05-03

## 2017-05-05 ENCOUNTER — TRANSFERRED RECORDS (OUTPATIENT)
Dept: HEALTH INFORMATION MANAGEMENT | Facility: CLINIC | Age: 42
End: 2017-05-05

## 2017-05-19 ENCOUNTER — TELEPHONE (OUTPATIENT)
Dept: FAMILY MEDICINE | Facility: CLINIC | Age: 42
End: 2017-05-19

## 2017-05-19 DIAGNOSIS — Z00.00 HEALTHCARE MAINTENANCE: Primary | ICD-10-CM

## 2017-05-19 RX ORDER — LISDEXAMFETAMINE DIMESYLATE 20 MG/1
20 CAPSULE ORAL EVERY MORNING
Qty: 30 CAPSULE | Refills: 0 | Status: CANCELLED | OUTPATIENT
Start: 2017-05-19

## 2017-05-19 RX ORDER — MULTIPLE VITAMINS W/ MINERALS TAB 9MG-400MCG
1 TAB ORAL DAILY
Qty: 100 TABLET | Refills: 3 | Status: SHIPPED | OUTPATIENT
Start: 2017-05-19 | End: 2018-09-11

## 2017-05-19 RX ORDER — VITAMIN A ACETATE, BETA CAROTENE, ASCORBIC ACID, CHOLECALCIFEROL, .ALPHA.-TOCOPHEROL ACETATE, DL-, THIAMINE MONONITRATE, RIBOFLAVIN, NIACINAMIDE, PYRIDOXINE HYDROCHLORIDE, FOLIC ACID, CYANOCOBALAMIN, CALCIUM CARBONATE, FERROUS FUMARATE, ZINC OXIDE, CUPRIC OXIDE 3080; 12; 120; 400; 1; 1.84; 3; 20; 22; 920; 25; 200; 27; 10; 2 [IU]/1; UG/1; MG/1; [IU]/1; MG/1; MG/1; MG/1; MG/1; MG/1; [IU]/1; MG/1; MG/1; MG/1; MG/1; MG/1
TABLET, FILM COATED ORAL
Qty: 30 TABLET | Status: CANCELLED | OUTPATIENT
Start: 2017-05-19

## 2017-05-19 NOTE — TELEPHONE ENCOUNTER
Request for medication refill:    Date of last visit at clinic: 4/20/17    Please complete refill if appropriate and CLOSE ENCOUNTER.    Closing the encounter signifies the refill is complete.    If refill has been denied, please complete the smart phrase .smirefuse and route it to the [facility] RN TRIAGE pool to inform the patient and the pharmacy.    Chastity Shanks

## 2017-05-19 NOTE — TELEPHONE ENCOUNTER
[facility] phone call message- patient requesting a refill:    Full Medication Name: prenatal 27-1 mg tablet    Dose: one time daily-currently on med list as pt reported.    Pharmacy confirmed as   [facility] [place] [address] [place] [address]  Phone: [phone] Fax: [phone]  : Yes    Additional Comments: please refill     OK to leave a message on voice mail? Yes    Primary language: English      needed? No    Call taken on May 19, 2017 at 8:47 AM by Kylee Torre

## 2017-05-19 NOTE — TELEPHONE ENCOUNTER
Medication Refill Denied  Reason: Patient needs:  Psychiatry provider to refill this medication  Provider: I have not called the patient about the Rx denial, please call.  PCS: Please notify the pharmacy  RN: Please contact the patient to explain reasoning provided above.  RN may not order temporary refill so that the patient will not run out of medication prior to the scheduled visit.    TARAH Salazar CNP

## 2018-08-21 ENCOUNTER — OFFICE VISIT (OUTPATIENT)
Dept: FAMILY MEDICINE | Facility: CLINIC | Age: 43
End: 2018-08-21
Payer: COMMERCIAL

## 2018-08-21 VITALS
DIASTOLIC BLOOD PRESSURE: 67 MMHG | WEIGHT: 163.6 LBS | OXYGEN SATURATION: 97 % | SYSTOLIC BLOOD PRESSURE: 98 MMHG | BODY MASS INDEX: 26.29 KG/M2 | HEIGHT: 66 IN | TEMPERATURE: 97.6 F | HEART RATE: 77 BPM

## 2018-08-21 DIAGNOSIS — Z13.220 LIPID SCREENING: ICD-10-CM

## 2018-08-21 DIAGNOSIS — F39 MOOD DISORDER (H): Primary | ICD-10-CM

## 2018-08-21 DIAGNOSIS — F17.200 TOBACCO USE DISORDER: ICD-10-CM

## 2018-08-21 DIAGNOSIS — F15.21 AMPHETAMINE SUBSTANCE USE DISORDER, MODERATE, IN EARLY REMISSION (H): ICD-10-CM

## 2018-08-21 LAB
ALBUMIN SERPL-MCNC: 4.1 MG/DL (ref 3.8–5)
ALP SERPL-CCNC: 78.1 U/L (ref 31.7–110.7)
ALT SERPL-CCNC: 30.1 U/L (ref 0–45)
AST SERPL-CCNC: 12.9 U/L (ref 0–55)
BILIRUB SERPL-MCNC: <0.4 MG/DL (ref 0.2–1.3)
BUN SERPL-MCNC: 11.5 MG/DL (ref 7–21)
CALCIUM SERPL-MCNC: 9.5 MG/DL (ref 8.5–10.1)
CHLORIDE SERPLBLD-SCNC: 101.2 MMOL/L (ref 98–110)
CHOLEST SERPL-MCNC: 254.3 MG/DL (ref 0–200)
CHOLEST/HDLC SERPL: 4.9 {RATIO} (ref 0–5)
CO2 SERPL-SCNC: 28.4 MMOL/L (ref 20–32)
CREAT SERPL-MCNC: 0.9 MG/DL (ref 0.7–1.3)
GFR SERPL CREATININE-BSD FRML MDRD: >90 ML/MIN/1.7 M2
GLUCOSE SERPL-MCNC: 107.2 MG'DL (ref 70–99)
HDLC SERPL-MCNC: 52 MG/DL
POTASSIUM SERPL-SCNC: 4.6 MMOL/DL (ref 3.3–4.5)
PROT SERPL-MCNC: 6.6 G/DL (ref 6.8–8.8)
SODIUM SERPL-SCNC: 134.4 MMOL/L (ref 132.6–141.4)
TRIGL SERPL-MCNC: 400.9 MG/DL (ref 0–150)
VLDL CHOLESTEROL: 80.2 MG/DL (ref 7–32)

## 2018-08-21 RX ORDER — BUPROPION HYDROCHLORIDE 300 MG/1
300 TABLET ORAL
COMMUNITY
Start: 2018-08-07 | End: 2018-11-23

## 2018-08-21 RX ORDER — QUETIAPINE FUMARATE 100 MG/1
TABLET, FILM COATED ORAL
COMMUNITY
Start: 2017-09-03

## 2018-08-21 RX ORDER — TRAZODONE HYDROCHLORIDE 100 MG/1
100 TABLET ORAL
COMMUNITY
Start: 2017-09-03

## 2018-08-21 RX ORDER — LAMOTRIGINE 25 MG/1
25 TABLET ORAL
COMMUNITY
Start: 2017-09-03 | End: 2019-04-18

## 2018-08-21 RX ORDER — CLONIDINE HYDROCHLORIDE 0.1 MG/1
0.1 TABLET ORAL
COMMUNITY
Start: 2018-08-07 | End: 2018-10-24

## 2018-08-21 NOTE — PROGRESS NOTES
Preceptor Attestation:   Patient seen, evaluated and discussed with the resident.   I have verified the content of the note, which accurately reflects my assessment of the patient and the plan of care.   Supervising Physician:  Quinn Bach MD

## 2018-08-21 NOTE — MR AVS SNAPSHOT
After Visit Summary   8/21/2018    Paul Godfrey    MRN: [id number]           Patient Information     Date Of Birth          1975        Visit Information        Provider Department      8/21/2018 10:20 AM Mario Hawley MD [facility]        Today's Diagnoses     Mood disorder (H)    -  1    Lipid screening        Tobacco use disorder        Amphetamine substance use disorder, moderate, in early remission (H)          Care Instructions      Preventive Health Recommendations  Male Ages 40 to 49    Yearly exam:             See your health care provider every year in order to  o   Review health changes.   o   Discuss preventive care.    o   Review your medicines if your doctor has prescribed any.    You should be tested each year for STDs (sexually transmitted diseases) if you re at risk.     Have a cholesterol test every 5 years.     Have a colonoscopy (test for colon cancer) if someone in your family has had colon cancer or polyps before age 50.     After age 45, have a diabetes test (fasting glucose). If you are at risk for diabetes, you should have this test every 3 years.      Talk with your health care provider about whether or not a prostate cancer screening test (PSA) is right for you.    Shots: Get a flu shot each year. Get a tetanus shot every 10 years.     Nutrition:    Eat at least 5 servings of fruits and vegetables daily.     Eat whole-grain bread, whole-wheat pasta and brown rice instead of white grains and rice.     Get adequate Calcium and Vitamin D.     Lifestyle    Exercise for at least 150 minutes a week (30 minutes a day, 5 days a week). This will help you control your weight and prevent disease.     Limit alcohol to one drink per day.     No smoking.     Wear sunscreen to prevent skin cancer.     See your dentist every six months for an exam and cleaning.              Follow-ups after your visit        Follow-up notes from your care team      "Return in about 4 weeks (around 9/18/2018).      Who to contact     Please call your clinic at [phone] to:    Ask questions about your health    Make or cancel appointments    Discuss your medicines    Learn about your test results    Speak to your doctor            Additional Information About Your Visit        [employer] Information     [employer] gives you secure access to your electronic health record. If you see a primary care provider, you can also send messages to your care team and make appointments. If you have questions, please call your primary care clinic.  If you do not have a primary care provider, please call [phone] and they will assist you.      [employer] is an electronic gateway that provides easy, online access to your medical records. With [employer], you can request a clinic appointment, read your test results, renew a prescription or communicate with your care team.     To access your existing account, please contact your [facility] or call [phone] for assistance.        Care EveryWhere ID     This is your Care EveryWhere ID. This could be used by other organizations to access your [facility] medical records  [id number]        Your Vitals Were     Pulse Temperature Height Pulse Oximetry BMI (Body Mass Index)       77 97.6  F (36.4  C) (Oral) 5' 6\" (167.6 cm) 97% 26.41 kg/m2        Blood Pressure from Last 3 Encounters:   08/21/18 98/67   04/20/17 112/74   03/22/17 102/71    Weight from Last 3 Encounters:   08/21/18 163 lb 9.6 oz (74.2 kg)   04/20/17 173 lb (78.5 kg)   03/22/17 163 lb (73.9 kg)              We Performed the Following     Comprehensive Metabolic Panel ([facility])     Lipid [place] ([facility])          Today's Medication Changes          These changes are accurate as of 8/21/18  8:31 PM.  If you have any questions, ask your nurse or doctor.               Start taking these medicines.        Dose/Directions    nicotine 10 MG/ML Soln inhalation " solution   Commonly known as:  NICOTROL   Used for:  Tobacco use disorder   Started by:  Mario Hawley MD        Dose:  1 spray   Spray 1 spray in nostril every hour as needed for smoking cessation   Quantity:  3 Bottle   Refills:  3         These medicines have changed or have updated prescriptions.        Dose/Directions    propranolol 20 MG tablet   Commonly known as:  INDERAL   This may have changed:  Another medication with the same name was removed. Continue taking this medication, and follow the directions you see here.   Changed by:  Mario Hawley MD        Dose:  20 mg   Take 20 mg by mouth   Refills:  0         Stop taking these medicines if you haven't already. Please contact your care team if you have questions.     acetaminophen 325 MG Suppository   Commonly known as:  TYLENOL   Stopped by:  Mario Hawley MD           VYVANSE 20 MG capsule   Generic drug:  lisdexamfetamine   Stopped by:  Mario Hawley MD                Where to get your medicines      These medications were sent to [facility] [place] [id number] - [place], [place] - [address], [place] [address]     Phone:  [phone]     nicotine 10 MG/ML Soln inhalation solution                Primary Care Provider Office Phone # Fax #    Eva Henley, APRN [facility] [phone]       [address] [facility] [address]        Equal Access to Services     THOR ÁLVAREZ AH: Hadii nancy rollins hadasho Soomaali, waaxda luqadaha, qaybta kaalmada adeegyada, dwayne bui haysyed torre. So [facility] [phone].    ATENCIÓN: Si habla español, tiene a christianson disposición servicios gratuitos de asistencia lingüística. Llame al [phone].    We comply with applicable federal civil rights laws and [place] laws. We do not discriminate on the basis of race, color, national origin, age, disability, sex, sexual orientation, or gender identity.            Thank you!     Thank you  for choosing [facility]  for your care. Our goal is always to provide you with excellent care. Hearing back from our patients is one way we can continue to improve our services. Please take a few minutes to complete the written survey that you may receive in the mail after your visit with us. Thank you!             Your Updated Medication List - Protect others around you: Learn how to safely use, store and throw away your medicines at www.disposemymeds.org.          This list is accurate as of 8/21/18  8:31 PM.  Always use your most recent med list.                   Brand Name Dispense Instructions for use Diagnosis    albuterol 108 (90 Base) MCG/ACT inhaler    PROAIR HFA/PROVENTIL HFA/VENTOLIN HFA    1 Inhaler    Inhale 2 puffs into the lungs every 4 hours as needed for shortness of breath / dyspnea or wheezing    Intermittent asthma, uncomplicated       buPROPion 300 MG 24 hr tablet    WELLBUTRIN XL     Take 300 mg by mouth        cloNIDine 0.1 MG tablet    CATAPRES     Take 0.1 mg by mouth        diphenhydrAMINE 25 MG tablet    BENADRYL    56 tablet    Take 1-2 tablets (25-50 mg) by mouth every 6 hours as needed for itching or allergies    Intermittent asthma, uncomplicated       fluticasone 50 MCG/ACT spray    FLONASE    1 Bottle    Spray 2 sprays into both nostrils daily    Seasonal allergic rhinitis, unspecified allergic rhinitis trigger       ketotifen 0.025 % Soln ophthalmic solution    ZADITOR    1 Bottle    Place 1 drop into both eyes every 12 hours    Allergic conjunctivitis, bilateral       lamoTRIgine 25 MG tablet    LaMICtal     Take 25 mg by mouth        loratadine 10 MG tablet    CLARITIN    30 tablet    Take 1 tablet (10 mg) by mouth daily    Seasonal allergic rhinitis, unspecified allergic rhinitis trigger       multivitamin, therapeutic with minerals Tabs tablet     100 tablet    Take 1 tablet by mouth daily    Healthcare maintenance       nicotine 10 MG/ML Soln inhalation  solution    NICOTROL    3 Bottle    Spray 1 spray in nostril every hour as needed for smoking cessation    Tobacco use disorder       propranolol 20 MG tablet    INDERAL     Take 20 mg by mouth        * QUEtiapine 400 MG tablet    SEROquel     Take 400 mg by mouth        * QUEtiapine 100 MG tablet    SEROquel     Takes 100 mg Q AM, and Noon.  Takes 400 mg Q HS - Total of 600 mg/day        traZODone 100 MG tablet    DESYREL     Take 100 mg by mouth        * Notice:  This list has 2 medication(s) that are the same as other medications prescribed for you. Read the directions carefully, and ask your doctor or other care provider to review them with you.

## 2018-08-29 ENCOUNTER — MEDICAL CORRESPONDENCE (OUTPATIENT)
Dept: HEALTH INFORMATION MANAGEMENT | Facility: CLINIC | Age: 43
End: 2018-08-29

## 2018-09-07 ENCOUNTER — HOSPITAL ENCOUNTER (EMERGENCY)
Facility: CLINIC | Age: 43
Discharge: NURSING, REHAB OR LONG-TERM CARE | End: 2018-09-07
Attending: EMERGENCY MEDICINE | Admitting: EMERGENCY MEDICINE
Payer: COMMERCIAL

## 2018-09-07 ENCOUNTER — APPOINTMENT (OUTPATIENT)
Dept: CT IMAGING | Facility: CLINIC | Age: 43
End: 2018-09-07
Attending: EMERGENCY MEDICINE
Payer: COMMERCIAL

## 2018-09-07 ENCOUNTER — APPOINTMENT (OUTPATIENT)
Dept: GENERAL RADIOLOGY | Facility: CLINIC | Age: 43
End: 2018-09-07
Attending: EMERGENCY MEDICINE
Payer: COMMERCIAL

## 2018-09-07 VITALS
TEMPERATURE: 97.8 F | OXYGEN SATURATION: 98 % | BODY MASS INDEX: 27.49 KG/M2 | HEART RATE: 95 BPM | HEIGHT: 65 IN | WEIGHT: 165 LBS | SYSTOLIC BLOOD PRESSURE: 155 MMHG | RESPIRATION RATE: 16 BRPM | DIASTOLIC BLOOD PRESSURE: 87 MMHG

## 2018-09-07 DIAGNOSIS — S02.40FA CLOSED FRACTURE OF LEFT ZYGOMATIC ARCH, INITIAL ENCOUNTER (H): ICD-10-CM

## 2018-09-07 DIAGNOSIS — S70.01XA CONTUSION OF RIGHT HIP, INITIAL ENCOUNTER: ICD-10-CM

## 2018-09-07 DIAGNOSIS — S09.90XA CLOSED HEAD INJURY, INITIAL ENCOUNTER: ICD-10-CM

## 2018-09-07 PROCEDURE — 73502 X-RAY EXAM HIP UNI 2-3 VIEWS: CPT

## 2018-09-07 PROCEDURE — 70450 CT HEAD/BRAIN W/O DYE: CPT

## 2018-09-07 PROCEDURE — 25000132 ZZH RX MED GY IP 250 OP 250 PS 637: Performed by: EMERGENCY MEDICINE

## 2018-09-07 PROCEDURE — 99285 EMERGENCY DEPT VISIT HI MDM: CPT | Mod: 25

## 2018-09-07 PROCEDURE — 70486 CT MAXILLOFACIAL W/O DYE: CPT

## 2018-09-07 RX ORDER — OXYCODONE HYDROCHLORIDE 5 MG/1
5 TABLET ORAL ONCE
Status: DISCONTINUED | OUTPATIENT
Start: 2018-09-07 | End: 2018-09-08 | Stop reason: HOSPADM

## 2018-09-07 RX ORDER — ACETAMINOPHEN 500 MG
1000 TABLET ORAL ONCE
Status: COMPLETED | OUTPATIENT
Start: 2018-09-07 | End: 2018-09-07

## 2018-09-07 RX ORDER — OXYCODONE HYDROCHLORIDE 5 MG/1
5 TABLET ORAL EVERY 6 HOURS PRN
Qty: 12 TABLET | Refills: 0 | Status: SHIPPED | OUTPATIENT
Start: 2018-09-07 | End: 2018-09-11 | Stop reason: ALTCHOICE

## 2018-09-07 RX ADMIN — ACETAMINOPHEN 1000 MG: 500 TABLET, FILM COATED ORAL at 22:11

## 2018-09-07 ASSESSMENT — ENCOUNTER SYMPTOMS: ABDOMINAL PAIN: 0

## 2018-09-07 NOTE — ED AVS SNAPSHOT
Emergency Department    [address] [place] [address]    Phone:  [phone]    Fax:  [phone]                                       Paul Godfrey   MRN: [id number]    Department:   Emergency Department   Date of Visit:  9/7/2018           After Visit Summary Signature Page     I have received my discharge instructions, and my questions have been answered. I have discussed any challenges I see with this plan with the nurse or doctor.    ..........................................................................................................................................  Patient/Patient Representative Signature      ..........................................................................................................................................  Patient Representative Print Name and Relationship to Patient    ..................................................               ................................................  Date                                            Time    ..........................................................................................................................................  Reviewed by Signature/Title    ...................................................              ..............................................  Date                                                            Time          22EPIC Rev 08/18

## 2018-09-07 NOTE — ED AVS SNAPSHOT
Emergency Department    [address] [facility] [address]    Phone:  [phone]    Fax:  [phone]                                       Paul Godfrey   MRN: [id number]    Department:   Emergency Department   Date of Visit:  9/7/2018           Patient Information     Date Of Birth          1975        Your diagnoses for this visit were:     Closed head injury, initial encounter     Closed fracture of left zygomatic arch, initial encounter (H)     Contusion of right hip, initial encounter        You were seen by Andressa Coleman MD.      Follow-up Information     Follow up with Eliezer Baez MD.    Specialty:  Otolaryngology    Why:  Monday or Tuesday    Contact information:    [facility]  [address] [facility] [id number] [phone]          Discharge Instructions       You can use tylenol 1000 mg three times a day and ibuprofen 600mg four times  A day. If that is not sufficient you can use the oxycodone.  Discharge Instructions  Headache    You were seen today for a headache. Headaches may be caused by many different things such as muscle tension, sinus inflammation, anxiety and stress, having too little sleep, too much alcohol, some medical conditions or injury. You may have a migraine, which is caused by changes in the blood vessels in your head.  At this time your provider does not find that your headache is a sign of anything dangerous or life-threatening.  However, sometimes the signs of serious illness do not show up right away.      Generally, every Emergency Department visit should have a follow-up clinic visit with either a primary or a specialty clinic/provider. Please follow-up as instructed by your emergency provider today.    Return to the Emergency Department if:    You get a new fever of 100.4 F or higher.    Your headache gets much worse.    You get a stiff neck with your headache.    You get a new headache that is significantly  different or worse than headaches you have had before.    You are vomiting (throwing up) and cannot keep food or water down.    You have blurry or double vision or other problems with your eyes.    You have a new weakness on one side of your body.    You have difficulty with balance which is new.    You or your family thinks you are confused.    You have a seizure.    What can I do to help myself?    Pain medications - You may take a pain medication such as Tylenol  (acetaminophen), Advil , Motrin  (ibuprofen) or Aleve  (naproxen).    Take a pain reliever as soon as you notice symptoms.  Starting medications as soon as you start to have symptoms may lessen the amount of pain you have.    Relaxing in a quiet, dark room may help.    Get enough sleep and eat meals regularly.    You may need to watch for certain foods or other things which may trigger your headaches.  Keeping a journal of your headaches and possible triggers may help you and your primary provider to identify things which you should avoid which may be causing your headaches.  If you were given a prescription for medicine here today, be sure to read all of the information (including the package insert) that comes with your prescription.  This will include important information about the medicine, its side effects, and any warnings that you need to know about.  The pharmacist who fills the prescription can provide more information and answer questions you may have about the medicine.  If you have questions or concerns that the pharmacist cannot address, please call or return to the Emergency Department.   Remember that you can always come back to the Emergency Department if you are not able to see your regular provider in the amount of time listed above, if you get any new symptoms, or if there is anything that worries you.        Discharge References/Attachments     FRACTURE, FACIAL (ENGLISH)    HIP CONTUSION (ENGLISH)      24 Hour Appointment Hotline        To make an appointment at any [facility] clinic, call [phone] ([phone]). If you don't have a family doctor or clinic, we will help you find one. [facility] clinics are conveniently located to serve the needs of you and your family.             Review of your medicines      START taking        Dose / Directions Last dose taken    oxyCODONE IR 5 MG tablet   Commonly known as:  ROXICODONE   Dose:  5 mg   Quantity:  12 tablet        Take 1 tablet (5 mg) by mouth every 6 hours as needed for pain   Refills:  0          Our records show that you are taking the medicines listed below. If these are incorrect, please call your family doctor or clinic.        Dose / Directions Last dose taken    albuterol 108 (90 Base) MCG/ACT inhaler   Commonly known as:  PROAIR HFA/PROVENTIL HFA/VENTOLIN HFA   Dose:  2 puff   Quantity:  1 Inhaler        Inhale 2 puffs into the lungs every 4 hours as needed for shortness of breath / dyspnea or wheezing   Refills:  1        buPROPion 300 MG 24 hr tablet   Commonly known as:  WELLBUTRIN XL   Dose:  300 mg        Take 300 mg by mouth   Refills:  0        cloNIDine 0.1 MG tablet   Commonly known as:  CATAPRES   Dose:  0.1 mg        Take 0.1 mg by mouth   Refills:  0        diphenhydrAMINE 25 MG tablet   Commonly known as:  BENADRYL   Dose:  25-50 mg   Quantity:  56 tablet        Take 1-2 tablets (25-50 mg) by mouth every 6 hours as needed for itching or allergies   Refills:  1        fluticasone 50 MCG/ACT spray   Commonly known as:  FLONASE   Dose:  2 spray   Quantity:  1 Bottle        Spray 2 sprays into both nostrils daily   Refills:  11        ketotifen 0.025 % Soln ophthalmic solution   Commonly known as:  ZADITOR   Dose:  1 drop   Quantity:  1 Bottle        Place 1 drop into both eyes every 12 hours   Refills:  6        lamoTRIgine 25 MG tablet   Commonly known as:  LaMICtal   Dose:  25 mg        Take 25 mg by mouth   Refills:  0        loratadine 10 MG tablet   Commonly known  as:  CLARITIN   Dose:  10 mg   Quantity:  30 tablet        Take 1 tablet (10 mg) by mouth daily   Refills:  11        multivitamin, therapeutic with minerals Tabs tablet   Dose:  1 tablet   Quantity:  100 tablet        Take 1 tablet by mouth daily   Refills:  3        nicotine 10 MG/ML Soln inhalation solution   Commonly known as:  NICOTROL   Dose:  1 spray   Quantity:  3 Bottle        Spray 1 spray in nostril every hour as needed for smoking cessation   Refills:  3        propranolol 20 MG tablet   Commonly known as:  INDERAL   Dose:  20 mg        Take 20 mg by mouth   Refills:  0        * QUEtiapine 400 MG tablet   Commonly known as:  SEROquel   Dose:  400 mg        Take 400 mg by mouth   Refills:  0        * QUEtiapine 100 MG tablet   Commonly known as:  SEROquel        Takes 100 mg Q AM, and Noon.  Takes 400 mg Q HS - Total of 600 mg/day   Refills:  0        traZODone 100 MG tablet   Commonly known as:  DESYREL   Dose:  100 mg        Take 100 mg by mouth   Refills:  0        * Notice:  This list has 2 medication(s) that are the same as other medications prescribed for you. Read the directions carefully, and ask your doctor or other care provider to review them with you.            Information about OPIOIDS     PRESCRIPTION OPIOIDS: WHAT YOU NEED TO KNOW   We gave you an opioid (narcotic) pain medicine. It is important to manage your pain, but opioids are not always the best choice. You should first try all the other options your care team gave you. Take this medicine for as short a time (and as few doses) as possible.    Some activities can increase your pain, such as bandage changes or therapy sessions. It may help to take your pain medicine 30 to 60 minutes before these activities. Reduce your stress by getting enough sleep, working on hobbies you enjoy and practicing relaxation or meditation. Talk to your care team about ways to manage your pain beyond prescription opioids.    These medicines have  risks:    DO NOT drive when on new or higher doses of pain medicine. These medicines can affect your alertness and reaction times, and you could be arrested for driving under the influence (DUI). If you need to use opioids long-term, talk to your care team about driving.    DO NOT operate heavy machinery    DO NOT do any other dangerous activities while taking these medicines.    DO NOT drink any alcohol while taking these medicines.     If the opioid prescribed includes acetaminophen, DO NOT take with any other medicines that contain acetaminophen. Read all labels carefully. Look for the word  acetaminophen  or  Tylenol.  Ask your pharmacist if you have questions or are unsure.    You can get addicted to pain medicines, especially if you have a history of addiction (chemical, alcohol or substance dependence). Talk to your care team about ways to reduce this risk.    All opioids tend to cause constipation. Drink plenty of water and eat foods that have a lot of fiber, such as fruits, vegetables, prune juice, apple juice and high-fiber cereal. Take a laxative (Miralax, milk of magnesia, Colace, Senna) if you don t move your bowels at least every other day. Other side effects include upset stomach, sleepiness, dizziness, throwing up, tolerance (needing more of the medicine to have the same effect), physical dependence and slowed breathing.    Store your pills in a secure place, locked if possible. We will not replace any lost or stolen medicine. If you don t finish your medicine, please throw away (dispose) as directed by your pharmacist. The [place] [employer] has more information about safe disposal: https://www.pca.[facility].[place].us/living-green/managing-unwanted-medications        Prescriptions were sent or printed at these locations (1 Prescription)                   Other Prescriptions                Printed at Department/Unit printer (1 of 1)         oxyCODONE IR (ROXICODONE) 5 MG tablet                 Procedures and tests performed during your visit     CT Facial Bones without Contrast    CT Head w/o Contrast    XR Pelvis and Hip Right 1 View      Orders Needing Specimen Collection     None      Pending Results     No orders found from 9/5/2018 to 9/8/2018.            Pending Culture Results     No orders found from 9/5/2018 to 9/8/2018.            Pending Results Instructions     If you had any lab results that were not finalized at the time of your Discharge, you can call the ED Lab Result RN at [phone]. You will be contacted by this team for any positive Lab results or changes in treatment. The nurses are available 7 days a week from 10A to 6:30P.  You can leave a message 24 hours per day and they will return your call.        Test Results From Your Hospital Stay        9/7/2018 11:33 PM      Narrative     CT OF THE HEAD WITHOUT CONTRAST 9/7/2018 10:37 PM     COMPARISON: None.    HISTORY: Trauma, pain.     TECHNIQUE: Axial CT images of the head from the skull base to the  vertex were acquired without IV contrast.    FINDINGS: The ventricles and basal cisterns are within normal limits  in configuration. There is no midline shift. There are no extra-axial  fluid collections. Gray-white differentiation is well maintained.    No intracranial hemorrhage, mass or recent infarct.    The visualized paranasal sinuses are well-aerated. There is no  mastoiditis. There are no fractures of the skull base or calvaria.  Depressed fracture of the left zygomatic arch is noted.        Impression     IMPRESSION: Normal head CT.      Radiation dose for this scan was reduced using automated exposure  control, adjustment of the mA and/or kV according to patient size, or  iterative reconstruction technique    DENISE CARMONA MD         9/7/2018 11:33 PM      Narrative     CT OF THE FACE WITHOUT CONTRAST 9/7/2018 10:37 PM     COMPARISON: None    HISTORY: Left cheek pain.     TECHNIQUE: Helical thin-section axial CT images of  the face were  acquired without contrast. Coronal reconstructions were created from  the axial source data.        Impression     IMPRESSION: There are three fractures of the left zygomatic arch  resulting in depression of the zygomatic arch. There are no other  facial bone fractures. The paranasal sinuses are well aerated. The  orbits and globes are unremarkable.      Radiation dose for this scan was reduced using automated exposure  control, adjustment of the mA and/or kV according to patient size, or  iterative reconstruction technique    DENISE CARMONA MD         9/7/2018 10:36 PM      Narrative     PELVIS WITH RIGHT HIP LATERAL TWO VIEWS 9/7/2018 10:25 PM     HISTORY: trauma, pain;     COMPARISON: None.        Impression     IMPRESSION:  Negative for acute fracture or dislocation .    VINNY ROOT MD                Clinical Quality Measure: Blood Pressure Screening     Your blood pressure was checked while you were in the emergency department today. The last reading we obtained was  BP: 155/87 . Please read the guidelines below about what these numbers mean and what you should do about them.  If your systolic blood pressure (the top number) is less than 120 and your diastolic blood pressure (the bottom number) is less than 80, then your blood pressure is normal. There is nothing more that you need to do about it.  If your systolic blood pressure (the top number) is 120-139 or your diastolic blood pressure (the bottom number) is 80-89, your blood pressure may be higher than it should be. You should have your blood pressure rechecked within a year by a primary care provider.  If your systolic blood pressure (the top number) is 140 or greater or your diastolic blood pressure (the bottom number) is 90 or greater, you may have high blood pressure. High blood pressure is treatable, but if left untreated over time it can put you at risk for heart attack, stroke, or kidney failure. You should have your blood  pressure rechecked by a primary care provider within the next 4 weeks.  If your provider in the emergency department today gave you specific instructions to follow-up with your doctor or provider even sooner than that, you should follow that instruction and not wait for up to 4 weeks for your follow-up visit.        Thank you for choosing [facility]       Thank you for choosing [facility] for your care. Our goal is always to provide you with excellent care. Hearing back from our patients is one way we can continue to improve our services. Please take a few minutes to complete the written survey that you may receive in the mail after you visit with us. Thank you!        [facility] Information     [facility] gives you secure access to your electronic health record. If you see a primary care provider, you can also send messages to your care team and make appointments. If you have questions, please call your primary care clinic.  If you do not have a primary care provider, please call [phone] and they will assist you.        Care EveryWhere ID     This is your Care EveryWhere ID. This could be used by other organizations to access your [facility] medical records  [id number]        Equal Access to Services     THOR ÁLVAREZ : Hadrosey Kerns, watevin lushadia, qalaura kaalcheryl silvestre, dwayne ortega . So [facility] [phone].    ATENCIÓN: Si habla español, tiene a christianson disposición servicios gratuitos de asistencia lingüística. Llame al [phone].    We comply with applicable federal civil rights laws and [place] laws. We do not discriminate on the basis of race, color, national origin, age, disability, sex, sexual orientation, or gender identity.            After Visit Summary       This is your record. Keep this with you and show to your community pharmacist(s) and doctor(s) at your next visit.

## 2018-09-08 NOTE — ED NOTES
"Pt hit call light, yelling and cursing and stating he wanted to be discharged and he was not \"suicidal or homicidal and he cannot be held against his will.\" RN told patient that he was to be awaiting a ride from his group home and pt states \"I am not on commitment and I called someone to pick me up and I am leaving.\" Dr. Coleman updated. Pt continues to yell out in his room and is walking towards the door. RN tried to call group home and they did not answer.   "

## 2018-09-08 NOTE — DISCHARGE INSTRUCTIONS
You can use tylenol 1000 mg three times a day and ibuprofen 600mg four times  A day. If that is not sufficient you can use the oxycodone.  Discharge Instructions  Headache    You were seen today for a headache. Headaches may be caused by many different things such as muscle tension, sinus inflammation, anxiety and stress, having too little sleep, too much alcohol, some medical conditions or injury. You may have a migraine, which is caused by changes in the blood vessels in your head.  At this time your provider does not find that your headache is a sign of anything dangerous or life-threatening.  However, sometimes the signs of serious illness do not show up right away.      Generally, every Emergency Department visit should have a follow-up clinic visit with either a primary or a specialty clinic/provider. Please follow-up as instructed by your emergency provider today.    Return to the Emergency Department if:    You get a new fever of 100.4 F or higher.    Your headache gets much worse.    You get a stiff neck with your headache.    You get a new headache that is significantly different or worse than headaches you have had before.    You are vomiting (throwing up) and cannot keep food or water down.    You have blurry or double vision or other problems with your eyes.    You have a new weakness on one side of your body.    You have difficulty with balance which is new.    You or your family thinks you are confused.    You have a seizure.    What can I do to help myself?    Pain medications - You may take a pain medication such as Tylenol  (acetaminophen), Advil , Motrin  (ibuprofen) or Aleve  (naproxen).    Take a pain reliever as soon as you notice symptoms.  Starting medications as soon as you start to have symptoms may lessen the amount of pain you have.    Relaxing in a quiet, dark room may help.    Get enough sleep and eat meals regularly.    You may need to watch for certain foods or other things which may  trigger your headaches.  Keeping a journal of your headaches and possible triggers may help you and your primary provider to identify things which you should avoid which may be causing your headaches.  If you were given a prescription for medicine here today, be sure to read all of the information (including the package insert) that comes with your prescription.  This will include important information about the medicine, its side effects, and any warnings that you need to know about.  The pharmacist who fills the prescription can provide more information and answer questions you may have about the medicine.  If you have questions or concerns that the pharmacist cannot address, please call or return to the Emergency Department.   Remember that you can always come back to the Emergency Department if you are not able to see your regular provider in the amount of time listed above, if you get any new symptoms, or if there is anything that worries you.

## 2018-09-08 NOTE — ED NOTES
"Dr. Coleman talked with pt in [facility] as pt was leaving, explained to pt that it was his right to leave but that if he left now he would not get pain medication. Pt states, \"I don't care, I'm leaving\". Pt also refused dc paperwork. Pt's  here as well, left with pt. Will fax dc paperwork to group home per staff request.   "
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
## 2018-09-10 ENCOUNTER — HOSPITAL ENCOUNTER (EMERGENCY)
Facility: CLINIC | Age: 43
Discharge: HOME | End: 2018-09-10
Attending: EMERGENCY MEDICINE | Admitting: EMERGENCY MEDICINE
Payer: COMMERCIAL

## 2018-09-10 ENCOUNTER — APPOINTMENT (OUTPATIENT)
Dept: CT IMAGING | Facility: CLINIC | Age: 43
End: 2018-09-10
Attending: EMERGENCY MEDICINE
Payer: COMMERCIAL

## 2018-09-10 VITALS
HEIGHT: 65 IN | DIASTOLIC BLOOD PRESSURE: 50 MMHG | OXYGEN SATURATION: 99 % | RESPIRATION RATE: 20 BRPM | HEART RATE: 88 BPM | BODY MASS INDEX: 28.16 KG/M2 | SYSTOLIC BLOOD PRESSURE: 118 MMHG | WEIGHT: 169 LBS | TEMPERATURE: 97.9 F

## 2018-09-10 DIAGNOSIS — R29.898 WEAKNESS OF BOTH LOWER EXTREMITIES: ICD-10-CM

## 2018-09-10 DIAGNOSIS — F07.81 POST CONCUSSION SYNDROME: ICD-10-CM

## 2018-09-10 LAB
ANION GAP SERPL CALCULATED.3IONS-SCNC: 4 MMOL/L (ref 3–14)
BASOPHILS # BLD AUTO: 0 10E9/L (ref 0–0.2)
BASOPHILS NFR BLD AUTO: 0.2 %
BUN SERPL-MCNC: 11 MG/DL (ref 7–30)
CALCIUM SERPL-MCNC: 8.7 MG/DL (ref 8.5–10.1)
CHLORIDE SERPL-SCNC: 106 MMOL/L (ref 94–109)
CK SERPL-CCNC: 208 U/L (ref 30–300)
CO2 SERPL-SCNC: 29 MMOL/L (ref 20–32)
CREAT SERPL-MCNC: 1.03 MG/DL (ref 0.66–1.25)
DIFFERENTIAL METHOD BLD: NORMAL
EOSINOPHIL # BLD AUTO: 0.1 10E9/L (ref 0–0.7)
EOSINOPHIL NFR BLD AUTO: 1.3 %
ERYTHROCYTE [DISTWIDTH] IN BLOOD BY AUTOMATED COUNT: 13.9 % (ref 10–15)
GFR SERPL CREATININE-BSD FRML MDRD: 78 ML/MIN/1.7M2
GLUCOSE SERPL-MCNC: 97 MG/DL (ref 70–99)
HCT VFR BLD AUTO: 44.2 % (ref 40–53)
HGB BLD-MCNC: 15.1 G/DL (ref 13.3–17.7)
IMM GRANULOCYTES # BLD: 0.1 10E9/L (ref 0–0.4)
IMM GRANULOCYTES NFR BLD: 0.6 %
LYMPHOCYTES # BLD AUTO: 3 10E9/L (ref 0.8–5.3)
LYMPHOCYTES NFR BLD AUTO: 34.6 %
MAGNESIUM SERPL-MCNC: 2.3 MG/DL (ref 1.6–2.3)
MCH RBC QN AUTO: 30.3 PG (ref 26.5–33)
MCHC RBC AUTO-ENTMCNC: 34.2 G/DL (ref 31.5–36.5)
MCV RBC AUTO: 89 FL (ref 78–100)
MONOCYTES # BLD AUTO: 1 10E9/L (ref 0–1.3)
MONOCYTES NFR BLD AUTO: 10.8 %
NEUTROPHILS # BLD AUTO: 4.6 10E9/L (ref 1.6–8.3)
NEUTROPHILS NFR BLD AUTO: 52.5 %
NRBC # BLD AUTO: 0 10*3/UL
NRBC BLD AUTO-RTO: 0 /100
PHOSPHATE SERPL-MCNC: 3.6 MG/DL (ref 2.5–4.5)
PLATELET # BLD AUTO: 356 10E9/L (ref 150–450)
POTASSIUM SERPL-SCNC: 4.4 MMOL/L (ref 3.4–5.3)
RBC # BLD AUTO: 4.98 10E12/L (ref 4.4–5.9)
SODIUM SERPL-SCNC: 139 MMOL/L (ref 133–144)
WBC # BLD AUTO: 8.8 10E9/L (ref 4–11)

## 2018-09-10 PROCEDURE — 96374 THER/PROPH/DIAG INJ IV PUSH: CPT

## 2018-09-10 PROCEDURE — 96361 HYDRATE IV INFUSION ADD-ON: CPT

## 2018-09-10 PROCEDURE — 84100 ASSAY OF PHOSPHORUS: CPT | Performed by: EMERGENCY MEDICINE

## 2018-09-10 PROCEDURE — 85025 COMPLETE CBC W/AUTO DIFF WBC: CPT | Performed by: EMERGENCY MEDICINE

## 2018-09-10 PROCEDURE — 80048 BASIC METABOLIC PNL TOTAL CA: CPT | Performed by: EMERGENCY MEDICINE

## 2018-09-10 PROCEDURE — 25000128 H RX IP 250 OP 636: Performed by: EMERGENCY MEDICINE

## 2018-09-10 PROCEDURE — 70450 CT HEAD/BRAIN W/O DYE: CPT

## 2018-09-10 PROCEDURE — 99284 EMERGENCY DEPT VISIT MOD MDM: CPT | Mod: 25

## 2018-09-10 PROCEDURE — 82550 ASSAY OF CK (CPK): CPT | Performed by: EMERGENCY MEDICINE

## 2018-09-10 PROCEDURE — 83735 ASSAY OF MAGNESIUM: CPT | Performed by: EMERGENCY MEDICINE

## 2018-09-10 RX ORDER — CYCLOBENZAPRINE HCL 10 MG
5 TABLET ORAL 3 TIMES DAILY PRN
Qty: 9 TABLET | Refills: 0 | Status: SHIPPED | OUTPATIENT
Start: 2018-09-10 | End: 2018-09-10

## 2018-09-10 RX ORDER — LORAZEPAM 2 MG/ML
0.5 INJECTION INTRAMUSCULAR ONCE
Status: COMPLETED | OUTPATIENT
Start: 2018-09-10 | End: 2018-09-10

## 2018-09-10 RX ORDER — CYCLOBENZAPRINE HCL 5 MG
5 TABLET ORAL 3 TIMES DAILY PRN
Qty: 9 TABLET | Refills: 0 | Status: SHIPPED | OUTPATIENT
Start: 2018-09-10 | End: 2018-09-15

## 2018-09-10 RX ADMIN — LORAZEPAM 0.5 MG: 2 INJECTION INTRAMUSCULAR; INTRAVENOUS at 13:51

## 2018-09-10 RX ADMIN — SODIUM CHLORIDE 1000 ML: 9 INJECTION, SOLUTION INTRAVENOUS at 13:49

## 2018-09-10 ASSESSMENT — ENCOUNTER SYMPTOMS
HEADACHES: 1
DIZZINESS: 1
WEAKNESS: 1
MYALGIAS: 1

## 2018-09-10 NOTE — ED AVS SNAPSHOT
Emergency Department    [address] [place] [address]    Phone:  [phone]    Fax:  [phone]                                       Paul Godfrey   MRN: [id number]    Department:   Emergency Department   Date of Visit:  9/10/2018           After Visit Summary Signature Page     I have received my discharge instructions, and my questions have been answered. I have discussed any challenges I see with this plan with the nurse or doctor.    ..........................................................................................................................................  Patient/Patient Representative Signature      ..........................................................................................................................................  Patient Representative Print Name and Relationship to Patient    ..................................................               ................................................  Date                                            Time    ..........................................................................................................................................  Reviewed by Signature/Title    ...................................................              ..............................................  Date                                                            Time          22EPIC Rev 08/18
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
## 2018-09-10 NOTE — DISCHARGE INSTRUCTIONS
Coping with Concussion  Concussion is also known as mild traumatic brain injury (MTBI). It is often caused by a blow to the head, or a fall. You may have been unconscious for a few seconds or minutes after the injury. Or maybe you were dazed, confused, or  saw stars.  After this, you thought you were OK. Now, weeks or months later, you re having symptoms that may be caused by a concussion. The good news is that, in most people,  these symptoms will likely go away on their own. Most people with a concussion recover fully, with no need for treatment.     A cold compress can help relieve a headache.    What is a concussion?  A concussion is a mild form of brain injury. In some cases, the effects of a concussion go away within days of the injury. In others, symptoms may continue for a few months. Fortunately, a concussion is temporary. Even when symptoms stay for months, they do go away over time. If they don't, or if your symptoms are worse, contact your healthcare provider.  Symptoms of a concussion  You may have noticed some of these symptoms:    Headaches    Irritability and other changes in behavior    Problems remembering or concentrating    Dizziness or lack of coordination    Fatigue    Problems sleeping    Sensitivity to light and sound    Vision changes  NOTE: If you have severe symptoms or trouble functioning, talk with your healthcare provider right away. If you had a more serious head injury than a concussion, you likely need treatment. Be sure to see your healthcare provider for an evaluation.   What you can do  Since the effects of a concussion go away over time, there isn t a lot you need to do. Be assured that this problem is temporary. You ll likely have a full recovery. In the meantime, talk with your healthcare provider about ways to relieve any symptoms that are bothering you. These tips may help:    Don't return to sports or any activity that could cause you to hit your head until all symptoms  are gone and you have been cleared by your doctor. A second head injury before fully recovering from the first one can lead to serious brain injury.    Return to normal activities of daily living and normal social interaction is encouraged to speed recovery.    Stress can make symptoms worse. Help calm yourself by resting in a quiet place and imagining a peaceful scene. Relax your muscles by soaking in a hot bath or taking a hot shower.    Take over-the-counter  acetaminophen to relieve headache pain. Take them as directed on the package. Don't take ibuprofen or aspirin after a head injury.    If you become dizzy, sit or lie down in a safe place until the sensation passes. Don t drive when you feel dizzy or disoriented.    If you re having trouble sleeping, try to keep a regular sleep schedule. Go to bed and get up at the same time each day. Avoid or limit caffeine and nicotine. Also don't drink alcohol. It may help you sleep at first, but your sleep will not be restful.    Give yourself time to heal. Your recovery will take some time. When you have symptoms, remember that you won t feel this way forever. In time the symptoms will go away and you ll be back to yourself.  If you re not feeling better  The effects of a concussion often go away in 7 to 10 days and the vast majority of people who have had a concussion have recovered after 3 months. If you re not feeling better as time passes, there may be something else going on. If your symptoms don t go away or you notice new ones, talk with your healthcare provider. He or she can help you get the treatment you need.   Date Last Reviewed: 1/1/2018 2000-2017 [employer]. [address] [place] [address]. All rights reserved. This information is not intended as a substitute for professional medical care. Always follow your healthcare professional's instructions.          Discharge Instructions  Head Injury    You have been seen today for a head  injury. Your evaluation included a history and physical examination. You may have had a CT (CAT) scan performed, though most head injuries do not require a scan. Based on this evaluation, your provider today does not feel that your head injury is serious.    Generally, every Emergency Department visit should have a follow-up clinic visit with either a primary or a specialty clinic/provider. Please follow-up as instructed by your emergency provider today.  Return to the Emergency Department if:    You are confused or you are not acting right.    Your headache gets worse or you start to have a really bad headache even with your recommended treatment plan.    You vomit (throw up) more than once.    You have a seizure.    You have trouble walking.    You have weakness or paralysis (cannot move) in an arm or a leg.    You have blood or fluid coming from your ears or nose.    You have new symptoms or anything that worries you.    Sleeping:  It is okay for you to sleep, but someone should wake you up if instructed by your provider, and someone should check on you at your usual time to wake up.     Activity:    Do not drive for at least 24 hours.    Do not drive if you have dizzy spells or trouble concentrating, or remembering things.    Do not return to any contact sports until cleared by your regular provider.     MORE INFORMATION:    Concussion:  A concussion is a minor head injury that may cause temporary problems with the way the brain works. Although concussions are important, they are generally not an emergency or a reason that a person needs to be hospitalized. Some concussion symptoms include confusion, amnesia (forgetful), nausea (sick to your stomach) and vomiting (throwing up), dizziness, fatigue, memory or concentration problems, irritability and sleep problems. For most people, concussions are mild and temporary but some will have more severe and persistent symptoms that require on-going care and  treatment.  CT Scans: Your evaluation today may have included a CT scan (CAT scan) to look for things like bleeding or a skull fracture (broken bone).  CT scans involve radiation and too many CT scans can cause serious health problems like cancer, especially in children.  Because of this, your provider may not have ordered a CT scan today if they think you are at low risk for a serious or life threatening problem.    If you were given a prescription for medicine here today, be sure to read all of the information (including the package insert) that comes with your prescription.  This will include important information about the medicine, its side effects, and any warnings that you need to know about.  The pharmacist who fills the prescription can provide more information and answer questions you may have about the medicine.  If you have questions or concerns that the pharmacist cannot address, please call or return to the Emergency Department.     Remember that you can always come back to the Emergency Department if you are not able to see your regular provider in the amount of time listed above, if you get any new symptoms, or if there is anything that worries you.          We will start you on a muscle relaxer for your muscle spasms  Your labs and CAT scan were negative  The prescription is for Flexeril 5 mg 3 times a day for 3 days.  We will have the concussion clinic call you for a follow-up appointment

## 2018-09-10 NOTE — ED AVS SNAPSHOT
Emergency Department    [address] [facility] [address]    Phone:  [phone]    Fax:  [phone]                                       Paul Godfrey   MRN: [id number]    Department:   Emergency Department   Date of Visit:  9/10/2018           Patient Information     Date Of Birth          1975        Your diagnoses for this visit were:     Post concussion syndrome     Weakness of both lower extremities        You were seen by Abdi Diggs MD.      Follow-up Information     Follow up with Mario Hawley MD.    Specialty:  Student in organized health care education/training program    Why:  As needed, If symptoms worsen    Contact information:    [facility]  [address] [place] [id number]  [phone]          Follow up with  Emergency Department.    Specialty:  EMERGENCY MEDICINE    Why:  As needed, If symptoms worsen    Contact information:    [address] [facility] [id number] [phone]        Discharge Instructions         Coping with Concussion  Concussion is also known as mild traumatic brain injury (MTBI). It is often caused by a blow to the head, or a fall. You may have been unconscious for a few seconds or minutes after the injury. Or maybe you were dazed, confused, or  saw stars.  After this, you thought you were OK. Now, weeks or months later, you re having symptoms that may be caused by a concussion. The good news is that, in most people,  these symptoms will likely go away on their own. Most people with a concussion recover fully, with no need for treatment.     A cold compress can help relieve a headache.    What is a concussion?  A concussion is a mild form of brain injury. In some cases, the effects of a concussion go away within days of the injury. In others, symptoms may continue for a few months. Fortunately, a concussion is temporary. Even when symptoms stay for months, they do go away over time.  If they don't, or if your symptoms are worse, contact your healthcare provider.  Symptoms of a concussion  You may have noticed some of these symptoms:    Headaches    Irritability and other changes in behavior    Problems remembering or concentrating    Dizziness or lack of coordination    Fatigue    Problems sleeping    Sensitivity to light and sound    Vision changes  NOTE: If you have severe symptoms or trouble functioning, talk with your healthcare provider right away. If you had a more serious head injury than a concussion, you likely need treatment. Be sure to see your healthcare provider for an evaluation.   What you can do  Since the effects of a concussion go away over time, there isn t a lot you need to do. Be assured that this problem is temporary. You ll likely have a full recovery. In the meantime, talk with your healthcare provider about ways to relieve any symptoms that are bothering you. These tips may help:    Don't return to sports or any activity that could cause you to hit your head until all symptoms are gone and you have been cleared by your doctor. A second head injury before fully recovering from the first one can lead to serious brain injury.    Return to normal activities of daily living and normal social interaction is encouraged to speed recovery.    Stress can make symptoms worse. Help calm yourself by resting in a quiet place and imagining a peaceful scene. Relax your muscles by soaking in a hot bath or taking a hot shower.    Take over-the-counter  acetaminophen to relieve headache pain. Take them as directed on the package. Don't take ibuprofen or aspirin after a head injury.    If you become dizzy, sit or lie down in a safe place until the sensation passes. Don t drive when you feel dizzy or disoriented.    If you re having trouble sleeping, try to keep a regular sleep schedule. Go to bed and get up at the same time each day. Avoid or limit caffeine and nicotine. Also don't drink  alcohol. It may help you sleep at first, but your sleep will not be restful.    Give yourself time to heal. Your recovery will take some time. When you have symptoms, remember that you won t feel this way forever. In time the symptoms will go away and you ll be back to yourself.  If you re not feeling better  The effects of a concussion often go away in 7 to 10 days and the vast majority of people who have had a concussion have recovered after 3 months. If you re not feeling better as time passes, there may be something else going on. If your symptoms don t go away or you notice new ones, talk with your healthcare provider. He or she can help you get the treatment you need.   Date Last Reviewed: 1/1/2018 2000-2017 [employer]. [address] [place] [address]. All rights reserved. This information is not intended as a substitute for professional medical care. Always follow your healthcare professional's instructions.          Discharge Instructions  Head Injury    You have been seen today for a head injury. Your evaluation included a history and physical examination. You may have had a CT (CAT) scan performed, though most head injuries do not require a scan. Based on this evaluation, your provider today does not feel that your head injury is serious.    Generally, every Emergency Department visit should have a follow-up clinic visit with either a primary or a specialty clinic/provider. Please follow-up as instructed by your emergency provider today.  Return to the Emergency Department if:    You are confused or you are not acting right.    Your headache gets worse or you start to have a really bad headache even with your recommended treatment plan.    You vomit (throw up) more than once.    You have a seizure.    You have trouble walking.    You have weakness or paralysis (cannot move) in an arm or a leg.    You have blood or fluid coming from your ears or nose.    You have new symptoms or  anything that worries you.    Sleeping:  It is okay for you to sleep, but someone should wake you up if instructed by your provider, and someone should check on you at your usual time to wake up.     Activity:    Do not drive for at least 24 hours.    Do not drive if you have dizzy spells or trouble concentrating, or remembering things.    Do not return to any contact sports until cleared by your regular provider.     MORE INFORMATION:    Concussion:  A concussion is a minor head injury that may cause temporary problems with the way the brain works. Although concussions are important, they are generally not an emergency or a reason that a person needs to be hospitalized. Some concussion symptoms include confusion, amnesia (forgetful), nausea (sick to your stomach) and vomiting (throwing up), dizziness, fatigue, memory or concentration problems, irritability and sleep problems. For most people, concussions are mild and temporary but some will have more severe and persistent symptoms that require on-going care and treatment.  CT Scans: Your evaluation today may have included a CT scan (CAT scan) to look for things like bleeding or a skull fracture (broken bone).  CT scans involve radiation and too many CT scans can cause serious health problems like cancer, especially in children.  Because of this, your provider may not have ordered a CT scan today if they think you are at low risk for a serious or life threatening problem.    If you were given a prescription for medicine here today, be sure to read all of the information (including the package insert) that comes with your prescription.  This will include important information about the medicine, its side effects, and any warnings that you need to know about.  The pharmacist who fills the prescription can provide more information and answer questions you may have about the medicine.  If you have questions or concerns that the pharmacist cannot address, please call or  return to the Emergency Department.     Remember that you can always come back to the Emergency Department if you are not able to see your regular provider in the amount of time listed above, if you get any new symptoms, or if there is anything that worries you.          We will start you on a muscle relaxer for your muscle spasms  Your labs and CAT scan were negative  The prescription is for Flexeril 5 mg 3 times a day for 3 days.  We will have the concussion clinic call you for a follow-up appointment        Your next 10 appointments already scheduled     Sep 11, 2018  9:20 AM CDT   Return Visit with DO [facility] ([facility])    [address],  [address]  [place] [address]   [phone]              24 Hour Appointment Hotline       To make an appointment at any [facility], call [phone] ([phone]). If you don't have a family doctor or clinic, we will help you find one. [facility] clinics are conveniently located to serve the needs of you and your family.          ED Discharge Orders     CONCUSSION  REFERRAL       You have been referred to [facility] Concussion  service.    The  Representative will assist you in the coordination of your concussion care as prescribed by your provider.    The  Representative will contact you within one business day, or you may contact the  Representative at [phone].    Referral Options:  Non-Sports related concussion management    Coverage of these services are subject to the terms and limitations of your health insurance plan.  Please call member services at your health plan with any benefit or coverage questions.     If X-rays, CT or MRI's have been performed, please contact the facility where they were done, to arrange for  prior to your scheduled appointment.  Please bring this referral request to your appointment and present it to your specialist.                      Review of your medicines      START taking        Dose / Directions Last dose taken    cyclobenzaprine 5 MG tablet   Commonly known as:  FLEXERIL   Dose:  5 mg   Quantity:  9 tablet        Take 1 tablet (5 mg) by mouth 3 times daily as needed for muscle spasms   Refills:  0          Our records show that you are taking the medicines listed below. If these are incorrect, please call your family doctor or clinic.        Dose / Directions Last dose taken    albuterol 108 (90 Base) MCG/ACT inhaler   Commonly known as:  PROAIR HFA/PROVENTIL HFA/VENTOLIN HFA   Dose:  2 puff   Quantity:  1 Inhaler        Inhale 2 puffs into the lungs every 4 hours as needed for shortness of breath / dyspnea or wheezing   Refills:  1        buPROPion 300 MG 24 hr tablet   Commonly known as:  WELLBUTRIN XL   Dose:  300 mg        Take 300 mg by mouth   Refills:  0        cloNIDine 0.1 MG tablet   Commonly known as:  CATAPRES   Dose:  0.1 mg        Take 0.1 mg by mouth   Refills:  0        diphenhydrAMINE 25 MG tablet   Commonly known as:  BENADRYL   Dose:  25-50 mg   Quantity:  56 tablet        Take 1-2 tablets (25-50 mg) by mouth every 6 hours as needed for itching or allergies   Refills:  1        fluticasone 50 MCG/ACT spray   Commonly known as:  FLONASE   Dose:  2 spray   Quantity:  1 Bottle        Spray 2 sprays into both nostrils daily   Refills:  11        ketotifen 0.025 % Soln ophthalmic solution   Commonly known as:  ZADITOR   Dose:  1 drop   Quantity:  1 Bottle        Place 1 drop into both eyes every 12 hours   Refills:  6        lamoTRIgine 25 MG tablet   Commonly known as:  LaMICtal   Dose:  25 mg        Take 25 mg by mouth   Refills:  0        loratadine 10 MG tablet   Commonly known as:  CLARITIN   Dose:  10 mg   Quantity:  30 tablet        Take 1 tablet (10 mg) by mouth daily   Refills:  11        multivitamin, therapeutic with minerals Tabs tablet   Dose:  1 tablet   Quantity:  100 tablet        Take 1  tablet by mouth daily   Refills:  3        nicotine 10 MG/ML Soln inhalation solution   Commonly known as:  NICOTROL   Dose:  1 spray   Quantity:  3 Bottle        Spray 1 spray in nostril every hour as needed for smoking cessation   Refills:  3        oxyCODONE IR 5 MG tablet   Commonly known as:  ROXICODONE   Dose:  5 mg   Quantity:  12 tablet        Take 1 tablet (5 mg) by mouth every 6 hours as needed for pain   Refills:  0        propranolol 20 MG tablet   Commonly known as:  INDERAL   Dose:  20 mg        Take 20 mg by mouth   Refills:  0        * QUEtiapine 400 MG tablet   Commonly known as:  SEROquel   Dose:  400 mg        Take 400 mg by mouth   Refills:  0        * QUEtiapine 100 MG tablet   Commonly known as:  SEROquel        Takes 100 mg Q AM, and Noon.  Takes 400 mg Q HS - Total of 600 mg/day   Refills:  0        traZODone 100 MG tablet   Commonly known as:  DESYREL   Dose:  100 mg        Take 100 mg by mouth   Refills:  0        * Notice:  This list has 2 medication(s) that are the same as other medications prescribed for you. Read the directions carefully, and ask your doctor or other care provider to review them with you.            Prescriptions were sent or printed at these locations (1 Prescription)                   Other Prescriptions                Printed at Department/Unit printer (1 of 1)         cyclobenzaprine (FLEXERIL) 5 MG tablet                Procedures and tests performed during your visit     Basic metabolic panel    CBC with platelets differential    CK total    Head CT w/o contrast    IV access    Magnesium    Phosphorus      Orders Needing Specimen Collection     None      Pending Results     Date and Time Order Name Status Description    9/10/2018 1316 Head CT w/o contrast Preliminary             Pending Culture Results     No orders found from 9/8/2018 to 9/11/2018.            Pending Results Instructions     If you had any lab results that were not finalized at the time of your  Discharge, you can call the ED Lab Result RN at [phone]. You will be contacted by this team for any positive Lab results or changes in treatment. The nurses are available 7 days a week from 10A to 6:30P.  You can leave a message 24 hours per day and they will return your call.        Test Results From Your Hospital Stay        9/10/2018  2:24 PM      Component Results     Component Value Ref Range & Units Status    Sodium 139 133 - 144 mmol/L Final    Potassium 4.4 3.4 - 5.3 mmol/L Final    Chloride 106 94 - 109 mmol/L Final    Carbon Dioxide 29 20 - 32 mmol/L Final    Anion Gap 4 3 - 14 mmol/L Final    Glucose 97 70 - 99 mg/dL Final    Urea Nitrogen 11 7 - 30 mg/dL Final    Creatinine 1.03 0.66 - 1.25 mg/dL Final    GFR Estimate 78 >60 mL/min/1.7m2 Final    Non  GFR Calc    GFR Estimate If Black >90 >60 mL/min/1.7m2 Final    African American GFR Calc    Calcium 8.7 8.5 - 10.1 mg/dL Final         9/10/2018  1:57 PM      Component Results     Component Value Ref Range & Units Status    WBC 8.8 4.0 - 11.0 10e9/L Final    RBC Count 4.98 4.4 - 5.9 10e12/L Final    Hemoglobin 15.1 13.3 - 17.7 g/dL Final    Hematocrit 44.2 40.0 - 53.0 % Final    MCV 89 78 - 100 fl Final    MCH 30.3 26.5 - 33.0 pg Final    MCHC 34.2 31.5 - 36.5 g/dL Final    RDW 13.9 10.0 - 15.0 % Final    Platelet Count 356 150 - 450 10e9/L Final    Diff Method Automated Method  Final    % Neutrophils 52.5 % Final    % Lymphocytes 34.6 % Final    % Monocytes 10.8 % Final    % Eosinophils 1.3 % Final    % Basophils 0.2 % Final    % Immature Granulocytes 0.6 % Final    Nucleated RBCs 0 0 /100 Final    Absolute Neutrophil 4.6 1.6 - 8.3 10e9/L Final    Absolute Lymphocytes 3.0 0.8 - 5.3 10e9/L Final    Absolute Monocytes 1.0 0.0 - 1.3 10e9/L Final    Absolute Eosinophils 0.1 0.0 - 0.7 10e9/L Final    Absolute Basophils 0.0 0.0 - 0.2 10e9/L Final    Abs Immature Granulocytes 0.1 0 - 0.4 10e9/L Final    Absolute Nucleated RBC 0.0  Final          9/10/2018  2:59 PM      Narrative     CT SCAN OF THE HEAD WITHOUT CONTRAST   9/10/2018 2:56 PM     HISTORY: recent trauma, bleed, known left Zygoma fx.;     TECHNIQUE:  Axial images of the head and coronal reformations without  IV contrast material. Radiation dose for this scan was reduced using  automated exposure control, adjustment of the mA and/or kV according  to patient size, or iterative reconstruction technique.    COMPARISON: CT dated 9/7/2018.    FINDINGS:  The ventricles are normal in size, shape and configuration.   The brain parenchyma and subarachnoid spaces are normal. There is no  evidence of intracranial hemorrhage, mass, acute infarct or anomaly.  The visualized portions of the sinuses and mastoids appear normal.  There is a fracture of the left zygomatic arch. This is also noted on  the recent CT. No intracranial hemorrhage is seen. No brain contusions  are identified.        Impression     IMPRESSION:   1. No intracranial hemorrhage or brain contusions are identified.  2. Fracture of the left zygomatic arch. This is unchanged in position   compared to 9/7/2018.           9/10/2018  2:24 PM      Component Results     Component Value Ref Range & Units Status    Magnesium 2.3 1.6 - 2.3 mg/dL Final         9/10/2018  2:24 PM      Component Results     Component Value Ref Range & Units Status    Phosphorus 3.6 2.5 - 4.5 mg/dL Final         9/10/2018  2:37 PM      Component Results     Component Value Ref Range & Units Status    CK Total 208 30 - 300 U/L Final                Clinical Quality Measure: Blood Pressure Screening     Your blood pressure was checked while you were in the emergency department today. The last reading we obtained was  BP: 121/47 . Please read the guidelines below about what these numbers mean and what you should do about them.  If your systolic blood pressure (the top number) is less than 120 and your diastolic blood pressure (the bottom number) is less than 80, then your  blood pressure is normal. There is nothing more that you need to do about it.  If your systolic blood pressure (the top number) is 120-139 or your diastolic blood pressure (the bottom number) is 80-89, your blood pressure may be higher than it should be. You should have your blood pressure rechecked within a year by a primary care provider.  If your systolic blood pressure (the top number) is 140 or greater or your diastolic blood pressure (the bottom number) is 90 or greater, you may have high blood pressure. High blood pressure is treatable, but if left untreated over time it can put you at risk for heart attack, stroke, or kidney failure. You should have your blood pressure rechecked by a primary care provider within the next 4 weeks.  If your provider in the emergency department today gave you specific instructions to follow-up with your doctor or provider even sooner than that, you should follow that instruction and not wait for up to 4 weeks for your follow-up visit.        Thank you for choosing [facility]       Thank you for choosing [facility] for your care. Our goal is always to provide you with excellent care. Hearing back from our patients is one way we can continue to improve our services. Please take a few minutes to complete the written survey that you may receive in the mail after you visit with us. Thank you!        [employer] Information     [employer] gives you secure access to your electronic health record. If you see a primary care provider, you can also send messages to your care team and make appointments. If you have questions, please call your primary care clinic.  If you do not have a primary care provider, please call [phone] and they will assist you.        Care EveryWhere ID     This is your Care EveryWhere ID. This could be used by other organizations to access your [facility] medical records  [id number]        Equal Access to Services     THOR ÁLVAREZ AH: amy Viera  olu mantilla waxay idiin hayaan adeeg kharash la'aan ah. So [facility] [phone].    ATENCIÓN: Si habla nita, tiene a christianson disposición servicios gratuitos de asistencia lingüística. Llame al [phone].    We comply with applicable federal civil rights laws and [place] laws. We do not discriminate on the basis of race, color, national origin, age, disability, sex, sexual orientation, or gender identity.            After Visit Summary       This is your record. Keep this with you and show to your community pharmacist(s) and doctor(s) at your next visit.

## 2018-09-11 ENCOUNTER — OFFICE VISIT (OUTPATIENT)
Dept: PHARMACY | Facility: CLINIC | Age: 43
End: 2018-09-11
Payer: COMMERCIAL

## 2018-09-11 ENCOUNTER — OFFICE VISIT (OUTPATIENT)
Dept: FAMILY MEDICINE | Facility: CLINIC | Age: 43
End: 2018-09-11
Payer: COMMERCIAL

## 2018-09-11 ENCOUNTER — TELEPHONE (OUTPATIENT)
Dept: FAMILY MEDICINE | Facility: CLINIC | Age: 43
End: 2018-09-11

## 2018-09-11 VITALS
BODY MASS INDEX: 28.09 KG/M2 | DIASTOLIC BLOOD PRESSURE: 74 MMHG | RESPIRATION RATE: 16 BRPM | OXYGEN SATURATION: 100 % | HEART RATE: 88 BPM | WEIGHT: 168.8 LBS | TEMPERATURE: 97.6 F | SYSTOLIC BLOOD PRESSURE: 109 MMHG

## 2018-09-11 DIAGNOSIS — F07.81 POSTCONCUSSION SYNDROME: ICD-10-CM

## 2018-09-11 DIAGNOSIS — J45.20 INTERMITTENT ASTHMA, UNCOMPLICATED: ICD-10-CM

## 2018-09-11 DIAGNOSIS — S02.40FD CLOSED FRACTURE OF LEFT ZYGOMATIC ARCH WITH ROUTINE HEALING, SUBSEQUENT ENCOUNTER: Primary | ICD-10-CM

## 2018-09-11 RX ORDER — ALBUTEROL SULFATE 90 UG/1
2 AEROSOL, METERED RESPIRATORY (INHALATION) EVERY 4 HOURS PRN
Qty: 1 INHALER | Refills: 1 | Status: SHIPPED | OUTPATIENT
Start: 2018-09-11 | End: 2019-04-18

## 2018-09-11 RX ORDER — ALBUTEROL SULFATE 90 UG/1
2 AEROSOL, METERED RESPIRATORY (INHALATION) EVERY 4 HOURS PRN
Qty: 1 INHALER | Refills: 1 | Status: SHIPPED | OUTPATIENT
Start: 2018-09-11 | End: 2018-09-11

## 2018-09-11 RX ORDER — DULOXETIN HYDROCHLORIDE 20 MG/1
20 CAPSULE, DELAYED RELEASE ORAL 2 TIMES DAILY
COMMUNITY
End: 2018-10-24

## 2018-09-11 RX ORDER — CELECOXIB 200 MG/1
200 CAPSULE ORAL 2 TIMES DAILY PRN
Qty: 60 CAPSULE | Refills: 1 | Status: SHIPPED | OUTPATIENT
Start: 2018-09-11 | End: 2019-02-21

## 2018-09-11 RX ORDER — HYDROCODONE BITARTRATE AND ACETAMINOPHEN 5; 325 MG/1; MG/1
1 TABLET ORAL EVERY 6 HOURS PRN
Qty: 10 TABLET | Refills: 0 | Status: SHIPPED | OUTPATIENT
Start: 2018-09-11 | End: 2018-10-24

## 2018-09-11 NOTE — PATIENT INSTRUCTIONS
Here is the plan from today's visit    1. Closed fracture of left zygomatic arch with routine healing, subsequent encounter  - celecoxib (CELEBREX) 200 MG capsule; Take 1 capsule (200 mg) by mouth 2 times daily as needed for moderate pain  Dispense: 60 capsule; Refill: 1  - HYDROcodone-acetaminophen (NORCO) 5-325 MG per tablet; Take 1 tablet by mouth every 6 hours as needed for severe pain  Dispense: 10 tablet; Refill: 0    2. Intermittent asthma, uncomplicated  Refilled med  - albuterol (PROAIR HFA/PROVENTIL HFA/VENTOLIN HFA) 108 (90 Base) MCG/ACT inhaler; Inhale 2 puffs into the lungs every 4 hours as needed for shortness of breath / dyspnea or wheezing  Dispense: 1 Inhaler; Refill: 1    3. Postconcussion syndrome  - CONCUSSION  REFERRAL    Please call or return to clinic if your symptoms don't go away.    Follow up plan  Please make a clinic appointment for follow up with your primary physician Mario Hawley MD in 2 weeks.    Thank you for coming to [facility] today.  Lab Testing:  **If you had lab testing today and your results are reassuring or normal they will be mailed to you or sent through [facility] within 7 days.   **If the lab tests need quick action we will call you with the results.  The phone number we will call with results is # [phone] (home) none (work). If this is not the best number please call our clinic and change the number.  Medication Refills:  If you need any refills please call your pharmacy and they will contact us.   If you need to  your refill at a new pharmacy, please contact the new pharmacy directly. The new pharmacy will help you get your medications transferred faster.   Scheduling:  If you have any concerns about today's visit or wish to schedule another appointment please call our office during normal business hours [phone] (8-5:00 M-F)  If a referral was made to a [facility] and you don't get a call from central scheduling  please call [phone].  If a Mammogram was ordered for you at [facility] call [phone] to schedule or change your appointment.  If you had an XRay/CT/Ultrasound/MRI ordered the number is [phone] to schedule or change your radiology appointment.   Medical Concerns:  If you have urgent medical concerns please call [phone] at any time of the day.    William Suarez, DO

## 2018-09-11 NOTE — PROGRESS NOTES
I have verified the content of the note, which accurately reflects my assessment of the patient and the plan of care.   Ian Cox, PharmD

## 2018-09-11 NOTE — MR AVS SNAPSHOT
After Visit Summary   9/11/2018    Paul Godfrey    MRN: [id number]           Patient Information     Date Of Birth          1975        Visit Information        Provider Department      9/11/2018 9:20 AM William Suarez DO [facility]        Today's Diagnoses     Closed fracture of left zygomatic arch with routine healing, subsequent encounter    -  1    Intermittent asthma, uncomplicated        Postconcussion syndrome          Care Instructions    Here is the plan from today's visit    1. Closed fracture of left zygomatic arch with routine healing, subsequent encounter  - celecoxib (CELEBREX) 200 MG capsule; Take 1 capsule (200 mg) by mouth 2 times daily as needed for moderate pain  Dispense: 60 capsule; Refill: 1  - HYDROcodone-acetaminophen (NORCO) 5-325 MG per tablet; Take 1 tablet by mouth every 6 hours as needed for severe pain  Dispense: 10 tablet; Refill: 0    2. Intermittent asthma, uncomplicated  Refilled med  - albuterol (PROAIR HFA/PROVENTIL HFA/VENTOLIN HFA) 108 (90 Base) MCG/ACT inhaler; Inhale 2 puffs into the lungs every 4 hours as needed for shortness of breath / dyspnea or wheezing  Dispense: 1 Inhaler; Refill: 1    3. Postconcussion syndrome  - CONCUSSION  REFERRAL    Please call or return to clinic if your symptoms don't go away.    Follow up plan  Please make a clinic appointment for follow up with your primary physician Mario Hawley MD in 2 weeks.    Thank you for coming to [facility] today.  Lab Testing:  **If you had lab testing today and your results are reassuring or normal they will be mailed to you or sent through [employer] within 7 days.   **If the lab tests need quick action we will call you with the results.  The phone number we will call with results is # [phone] (home) none (work). If this is not the best number please call our clinic and change the number.  Medication Refills:  If you need any refills please call your  pharmacy and they will contact us.   If you need to  your refill at a new pharmacy, please contact the new pharmacy directly. The new pharmacy will help you get your medications transferred faster.   Scheduling:  If you have any concerns about today's visit or wish to schedule another appointment please call our office during normal business hours [phone] (8-5:00 M-F)  If a referral was made to a [facility] and you don't get a call from central scheduling please call [phone].  If a Mammogram was ordered for you at [facility] call [phone] to schedule or change your appointment.  If you had an XRay/CT/Ultrasound/MRI ordered the number is [phone] to schedule or change your radiology appointment.   Medical Concerns:  If you have urgent medical concerns please call [phone] at any time of the day.    William Suarez, DO            Follow-ups after your visit        Additional Services     CONCUSSION  REFERRAL       You have been referred to [facility] Concussion  service.    The  Representative will assist you in the coordination of your concussion care as prescribed by your provider.    The  Representative will contact you within one business day, or you may contact the  Representative at [phone].    Referral Options:  Non-Sports related concussion management    Coverage of these services are subject to the terms and limitations of your health insurance plan.  Please call member services at your health plan with any benefit or coverage questions.     If X-rays, CT or MRI's have been performed, please contact the facility where they were done, to arrange for  prior to your scheduled appointment.  Please bring this referral request to your appointment and present it to your specialist.                  Who to contact     Please call your clinic at [phone] to:    Ask questions about your health    Make  or cancel appointments    Discuss your medicines    Learn about your test results    Speak to your doctor            Additional Information About Your Visit        [employer] Information     [employer] gives you secure access to your electronic health record. If you see a primary care provider, you can also send messages to your care team and make appointments. If you have questions, please call your primary care clinic.  If you do not have a primary care provider, please call [phone] and they will assist you.      [employer] is an electronic gateway that provides easy, online access to your medical records. With [employer], you can request a clinic appointment, read your test results, renew a prescription or communicate with your care team.     To access your existing account, please contact your [facility] or call [phone] for assistance.        Care EveryWhere ID     This is your Care EveryWhere ID. This could be used by other organizations to access your [facility] medical records  [id number]        Your Vitals Were     Pulse Temperature Respirations Pulse Oximetry BMI (Body Mass Index)       88 97.6  F (36.4  C) (Oral) 16 100% 28.09 kg/m2        Blood Pressure from Last 3 Encounters:   09/11/18 109/74   09/10/18 118/50   09/07/18 155/87    Weight from Last 3 Encounters:   09/11/18 168 lb 12.8 oz (76.6 kg)   09/10/18 169 lb (76.7 kg)   09/07/18 165 lb (74.8 kg)              We Performed the Following     CONCUSSION  REFERRAL          Today's Medication Changes          These changes are accurate as of 9/11/18 10:57 AM.  If you have any questions, ask your nurse or doctor.               Start taking these medicines.        Dose/Directions    albuterol 108 (90 Base) MCG/ACT inhaler   Commonly known as:  PROAIR HFA/PROVENTIL HFA/VENTOLIN HFA   Used for:  Intermittent asthma, uncomplicated   Started by:  William Suarez,         Dose:  2 puff   Inhale 2 puffs into the lungs every  4 hours as needed for shortness of breath / dyspnea or wheezing   Quantity:  1 Inhaler   Refills:  1       celecoxib 200 MG capsule   Commonly known as:  celeBREX   Used for:  Closed fracture of left zygomatic arch with routine healing, subsequent encounter   Started by:  William Suarez DO        Dose:  200 mg   Take 1 capsule (200 mg) by mouth 2 times daily as needed for moderate pain   Quantity:  60 capsule   Refills:  1       HYDROcodone-acetaminophen 5-325 MG per tablet   Commonly known as:  NORCO   Used for:  Closed fracture of left zygomatic arch with routine healing, subsequent encounter   Started by:  William Suarez DO        Dose:  1 tablet   Take 1 tablet by mouth every 6 hours as needed for severe pain   Quantity:  10 tablet   Refills:  0         Stop taking these medicines if you haven't already. Please contact your care team if you have questions.     oxyCODONE IR 5 MG tablet   Commonly known as:  ROXICODONE   Stopped by:  William Suarez DO                Where to get your medicines      These medications were sent to [facility] [place] [facility] - [place], [place] - [address], [place] [address]     Phone:  [phone]     celecoxib 200 MG capsule         These medications were sent to [facility] [address] [place] [address] [place] [address] [address] [facility] [address]     Phone:  [phone]     albuterol 108 (90 Base) MCG/ACT inhaler         Some of these will need a paper prescription and others can be bought over the counter.  Ask your nurse if you have questions.     Bring a paper prescription for each of these medications     HYDROcodone-acetaminophen 5-325 MG per tablet               Information about OPIOIDS     PRESCRIPTION OPIOIDS: WHAT YOU NEED TO KNOW   We gave you an opioid (narcotic) pain medicine. It is important to manage your pain, but opioids are not always the best choice. You should first try all the other options your care team  gave you. Take this medicine for as short a time (and as few doses) as possible.    Some activities can increase your pain, such as bandage changes or therapy sessions. It may help to take your pain medicine 30 to 60 minutes before these activities. Reduce your stress by getting enough sleep, working on hobbies you enjoy and practicing relaxation or meditation. Talk to your care team about ways to manage your pain beyond prescription opioids.    These medicines have risks:    DO NOT drive when on new or higher doses of pain medicine. These medicines can affect your alertness and reaction times, and you could be arrested for driving under the influence (DUI). If you need to use opioids long-term, talk to your care team about driving.    DO NOT operate heavy machinery    DO NOT do any other dangerous activities while taking these medicines.    DO NOT drink any alcohol while taking these medicines.     If the opioid prescribed includes acetaminophen, DO NOT take with any other medicines that contain acetaminophen. Read all labels carefully. Look for the word  acetaminophen  or  Tylenol.  Ask your pharmacist if you have questions or are unsure.    You can get addicted to pain medicines, especially if you have a history of addiction (chemical, alcohol or substance dependence). Talk to your care team about ways to reduce this risk.    All opioids tend to cause constipation. Drink plenty of water and eat foods that have a lot of fiber, such as fruits, vegetables, prune juice, apple juice and high-fiber cereal. Take a laxative (Miralax, milk of magnesia, Colace, Senna) if you don t move your bowels at least every other day. Other side effects include upset stomach, sleepiness, dizziness, throwing up, tolerance (needing more of the medicine to have the same effect), physical dependence and slowed breathing.    Store your pills in a secure place, locked if possible. We will not replace any lost or stolen medicine. If you  don t finish your medicine, please throw away (dispose) as directed by your pharmacist. The [place] Pollution Control Agency has more information about safe disposal: [email or web address]-medications         Primary Care Provider Office Phone # Fax #    Mario Hawley [phone]       [facility] [address]   [facility] [address]        Equal Access to Services     THOR ÁLVAREZ : Hadii aad ku hadasho Soomaali, waaxda luqadaha, qaybta kaalmada adeegyada, waxay idiin hayaan adeeg kharaanoop la'syed . So [facility] [phone].    ATENCIÓN: Si habla español, tiene a christianson disposición servicios gratuitos de asistencia lingüística. Rosa al [phone].    We comply with applicable federal civil rights laws and [place] laws. We do not discriminate on the basis of race, color, national origin, age, disability, sex, sexual orientation, or gender identity.            Thank you!     Thank you for choosing [facility]  for your care. Our goal is always to provide you with excellent care. Hearing back from our patients is one way we can continue to improve our services. Please take a few minutes to complete the written survey that you may receive in the mail after your visit with us. Thank you!             Your Updated Medication List - Protect others around you: Learn how to safely use, store and throw away your medicines at www.disposemymeds.org.          This list is accurate as of 9/11/18 10:57 AM.  Always use your most recent med list.                   Brand Name Dispense Instructions for use Diagnosis    albuterol 108 (90 Base) MCG/ACT inhaler    PROAIR HFA/PROVENTIL HFA/VENTOLIN HFA    1 Inhaler    Inhale 2 puffs into the lungs every 4 hours as needed for shortness of breath / dyspnea or wheezing    Intermittent asthma, uncomplicated       buPROPion 300 MG 24 hr tablet    WELLBUTRIN XL     Take 300 mg by mouth         celecoxib 200 MG capsule    celeBREX    60 capsule    Take 1 capsule (200 mg) by mouth 2 times daily as needed for moderate pain    Closed fracture of left zygomatic arch with routine healing, subsequent encounter       cloNIDine 0.1 MG tablet    CATAPRES     Take 0.1 mg by mouth        cyclobenzaprine 5 MG tablet    FLEXERIL    9 tablet    Take 1 tablet (5 mg) by mouth 3 times daily as needed for muscle spasms        DULoxetine 20 MG EC capsule    CYMBALTA     Take 20 mg by mouth 2 times daily        HYDROcodone-acetaminophen 5-325 MG per tablet    NORCO    10 tablet    Take 1 tablet by mouth every 6 hours as needed for severe pain    Closed fracture of left zygomatic arch with routine healing, subsequent encounter       lamoTRIgine 25 MG tablet    LaMICtal     Take 25 mg by mouth        * QUEtiapine 400 MG tablet    SEROquel     Take 400 mg by mouth        * QUEtiapine 100 MG tablet    SEROquel     Takes 100 mg Q AM, and Noon.  Takes 400 mg Q HS - Total of 600 mg/day        traZODone 100 MG tablet    DESYREL     Take 100 mg by mouth        * Notice:  This list has 2 medication(s) that are the same as other medications prescribed for you. Read the directions carefully, and ask your doctor or other care provider to review them with you.

## 2018-09-11 NOTE — MR AVS SNAPSHOT
After Visit Summary   9/11/2018    Paul Godfrey    MRN: [id number]           Patient Information     Date Of Birth          1975        Visit Information        Provider Department      9/11/2018 9:00 AM Geovanna Arteaga [facility]        Today's Diagnoses     Closed fracture of left zygomatic arch with routine healing, subsequent encounter    -  1       Follow-ups after your visit        Your next 10 appointments already scheduled     Sep 24, 2018 11:35 AM CDT   (Arrive by 11:20 AM)   BAM Extremity with America Bullock PT   [facility] ([facility]  )    [address]  [facility] [id number] [phone]            Sep 26, 2018  9:20 AM CDT   Return Visit with MD [facility] ([facility])    [address],  [address]  [facility] [address]   [phone]            Oct 03, 2018  1:40 PM CDT   (Arrive by 1:25 PM)   NEW FACIAL TRAUMA with Katie Dudley MD   [facility] ([facility])    [address]  [address]th [address]  [facility] [address] [phone]              Who to contact     Please call your clinic at [phone] to:    Ask questions about your health    Make or cancel appointments    Discuss your medicines    Learn about your test results    Speak to your doctor            Additional Information About Your Visit        MyChart Information     [employer] gives you secure access to your electronic health record. If you see a primary care provider, you can also send messages to your care team and make appointments. If you have questions, please call your primary care clinic.  If you do not have a primary care provider, please call [phone] and they will assist you.      [employer] is an electronic gateway that provides easy, online access to your medical records. With [employer], you  can request a clinic appointment, read your test results, renew a prescription or communicate with your care team.     To access your existing account, please contact your [facility] or call [phone] for assistance.        Care EveryWhere ID     This is your Care EveryWhere ID. This could be used by other organizations to access your [facility] medical records  [id number]         Blood Pressure from Last 3 Encounters:   09/20/18 126/80   09/15/18 112/85   09/14/18 109/71    Weight from Last 3 Encounters:   09/20/18 165 lb 3.2 oz (74.9 kg)   09/15/18 168 lb (76.2 kg)   09/14/18 160 lb (72.6 kg)              We Performed the Following     MEDICATION THERAPY, FACE TO FACE,  EA ADDITIONAL 15 MIN     MEDICATION THERAPY, FACE TO FACE, 1ST 15 MIN NEW PATIENT          Today's Medication Changes          These changes are accurate as of 9/11/18 11:59 PM.  If you have any questions, ask your nurse or doctor.               Start taking these medicines.        Dose/Directions    albuterol 108 (90 Base) MCG/ACT inhaler   Commonly known as:  PROAIR HFA/PROVENTIL HFA/VENTOLIN HFA   Used for:  Intermittent asthma, uncomplicated   Started by:  William Suarez DO        Dose:  2 puff   Inhale 2 puffs into the lungs every 4 hours as needed for shortness of breath / dyspnea or wheezing   Quantity:  1 Inhaler   Refills:  1       celecoxib 200 MG capsule   Commonly known as:  celeBREX   Used for:  Closed fracture of left zygomatic arch with routine healing, subsequent encounter   Started by:  William Suarez DO        Dose:  200 mg   Take 1 capsule (200 mg) by mouth 2 times daily as needed for moderate pain   Quantity:  60 capsule   Refills:  1       HYDROcodone-acetaminophen 5-325 MG per tablet   Commonly known as:  NORCO   Used for:  Closed fracture of left zygomatic arch with routine healing, subsequent encounter   Started by:  William Suarez DO        Dose:  1 tablet   Take 1 tablet by mouth every 6  hours as needed for severe pain   Quantity:  10 tablet   Refills:  0         Stop taking these medicines if you haven't already. Please contact your care team if you have questions.     oxyCODONE IR 5 MG tablet   Commonly known as:  ROXICODONE   Stopped by:  William Suarez, DO                Where to get your medicines      These medications were sent to [facility] [address] - [place], [place] - [address] [facility]  [address], [place] [address]     Phone:  [phone]     celecoxib 200 MG capsule         These medications were sent to [facility] - [place], [place] - [address] [address]  [address] [address] [address]     Phone:  [phone]     albuterol 108 (90 Base) MCG/ACT inhaler         Some of these will need a paper prescription and others can be bought over the counter.  Ask your nurse if you have questions.     Bring a paper prescription for each of these medications     HYDROcodone-acetaminophen 5-325 MG per tablet                Primary Care Provider Office Phone # Fax #    Mario Hawley [phone]       [facility] [address] [facility] [address]  [facility] [address]        Equal Access to Services     THOR ÁLVAREZ AH: Hadii nancy frasero Someenakshi, waaxda luqadaha, qaybta kaalmada adeegyada, dwayne torre. So [facility] [phone].    ATENCIÓN: Si habla español, tiene a christianson disposición servicios gratuitos de asistencia lingüística. Rosa al [phone].    We comply with applicable federal civil rights laws and [place] laws. We do not discriminate on the basis of race, color, national origin, age, disability, sex, sexual orientation, or gender identity.            Thank you!     Thank you for choosing [facility]  for your care. Our goal is always to provide you with excellent care. Hearing back from our patients is one way we can continue to improve our services. Please take a few  minutes to complete the written survey that you may receive in the mail after your visit with us. Thank you!             Your Updated Medication List - Protect others around you: Learn how to safely use, store and throw away your medicines at www.disposemymeds.org.          This list is accurate as of 9/11/18 11:59 PM.  Always use your most recent med list.                   Brand Name Dispense Instructions for use Diagnosis    albuterol 108 (90 Base) MCG/ACT inhaler    PROAIR HFA/PROVENTIL HFA/VENTOLIN HFA    1 Inhaler    Inhale 2 puffs into the lungs every 4 hours as needed for shortness of breath / dyspnea or wheezing    Intermittent asthma, uncomplicated       buPROPion 300 MG 24 hr tablet    WELLBUTRIN XL     Take 300 mg by mouth        celecoxib 200 MG capsule    celeBREX    60 capsule    Take 1 capsule (200 mg) by mouth 2 times daily as needed for moderate pain    Closed fracture of left zygomatic arch with routine healing, subsequent encounter       cloNIDine 0.1 MG tablet    CATAPRES     Take 0.1 mg by mouth        cyclobenzaprine 5 MG tablet    FLEXERIL    9 tablet    Take 1 tablet (5 mg) by mouth 3 times daily as needed for muscle spasms        DULoxetine 20 MG EC capsule    CYMBALTA     Take 20 mg by mouth 2 times daily        HYDROcodone-acetaminophen 5-325 MG per tablet    NORCO    10 tablet    Take 1 tablet by mouth every 6 hours as needed for severe pain    Closed fracture of left zygomatic arch with routine healing, subsequent encounter       lamoTRIgine 25 MG tablet    LaMICtal     Take 25 mg by mouth        * QUEtiapine 400 MG tablet    SEROquel     Take 400 mg by mouth        * QUEtiapine 100 MG tablet    SEROquel     Takes 100 mg Q AM, and Noon.  Takes 400 mg Q HS - Total of 600 mg/day        traZODone 100 MG tablet    DESYREL     Take 100 mg by mouth        * Notice:  This list has 2 medication(s) that are the same as other medications prescribed for you. Read the directions carefully, and ask  your doctor or other care provider to review them with you.
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
## 2018-09-11 NOTE — TELEPHONE ENCOUNTER
Pharmacy calling to say that celebrex was prescribed for patient who they have on record as having adverse reaction to asa. Medication is contraindicated.

## 2018-09-12 ASSESSMENT — ENCOUNTER SYMPTOMS
RHINORRHEA: 0
NUMBNESS: 0
DYSURIA: 0
WEAKNESS: 0
MYALGIAS: 1
HALLUCINATIONS: 0
DECREASED CONCENTRATION: 1
FEVER: 0
AGITATION: 0
FACIAL SWELLING: 1
TREMORS: 0
ARTHRALGIAS: 1
BACK PAIN: 1
NERVOUS/ANXIOUS: 0
HEADACHES: 1
CONSTIPATION: 0
VOMITING: 0
WHEEZING: 0
DIARRHEA: 0
COUGH: 0
CHILLS: 0
NAUSEA: 0
DIZZINESS: 1
FATIGUE: 1
DIFFICULTY URINATING: 0

## 2018-09-12 NOTE — PROGRESS NOTES
Preceptor Attestation:   Patient seen, evaluated and discussed with the resident. I have verified the content of the note, which accurately reflects my assessment of the patient and the plan of care.   Supervising Physician:  Clayton Aparicio MD

## 2018-09-14 ENCOUNTER — OFFICE VISIT (OUTPATIENT)
Dept: SURGERY | Facility: CLINIC | Age: 43
End: 2018-09-14
Payer: COMMERCIAL

## 2018-09-14 VITALS
HEIGHT: 65 IN | HEART RATE: 74 BPM | WEIGHT: 160 LBS | SYSTOLIC BLOOD PRESSURE: 109 MMHG | DIASTOLIC BLOOD PRESSURE: 71 MMHG | BODY MASS INDEX: 26.66 KG/M2 | OXYGEN SATURATION: 96 %

## 2018-09-14 DIAGNOSIS — S16.1XXA NECK STRAIN, INITIAL ENCOUNTER: ICD-10-CM

## 2018-09-14 DIAGNOSIS — R32 URINARY INCONTINENCE, UNSPECIFIED TYPE: ICD-10-CM

## 2018-09-14 DIAGNOSIS — R29.898 WEAKNESS OF BOTH LOWER EXTREMITIES: ICD-10-CM

## 2018-09-14 DIAGNOSIS — S06.0X1A CONCUSSION WITH LOSS OF CONSCIOUSNESS OF 30 MINUTES OR LESS, INITIAL ENCOUNTER: Primary | ICD-10-CM

## 2018-09-14 DIAGNOSIS — R42 DIZZINESS: ICD-10-CM

## 2018-09-14 ASSESSMENT — ANXIETY QUESTIONNAIRES
GAD7 TOTAL SCORE: 7
1. FEELING NERVOUS, ANXIOUS, OR ON EDGE: SEVERAL DAYS
5. BEING SO RESTLESS THAT IT IS HARD TO SIT STILL: SEVERAL DAYS
GAD7 TOTAL SCORE: 7
7. FEELING AFRAID AS IF SOMETHING AWFUL MIGHT HAPPEN: SEVERAL DAYS
6. BECOMING EASILY ANNOYED OR IRRITABLE: SEVERAL DAYS
3. WORRYING TOO MUCH ABOUT DIFFERENT THINGS: SEVERAL DAYS
2. NOT BEING ABLE TO STOP OR CONTROL WORRYING: SEVERAL DAYS
4. TROUBLE RELAXING: SEVERAL DAYS
7. FEELING AFRAID AS IF SOMETHING AWFUL MIGHT HAPPEN: SEVERAL DAYS
GAD7 TOTAL SCORE: 7

## 2018-09-14 ASSESSMENT — PATIENT HEALTH QUESTIONNAIRE - PHQ9
SUM OF ALL RESPONSES TO PHQ QUESTIONS 1-9: 8
SUM OF ALL RESPONSES TO PHQ QUESTIONS 1-9: 8
10. IF YOU CHECKED OFF ANY PROBLEMS, HOW DIFFICULT HAVE THESE PROBLEMS MADE IT FOR YOU TO DO YOUR WORK, TAKE CARE OF THINGS AT HOME, OR GET ALONG WITH OTHER PEOPLE: VERY DIFFICULT
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
## 2018-09-14 NOTE — PATIENT INSTRUCTIONS
No issues with concussion that would prevent your facial bone surgery.    Tonny will contact you to schedule physical therapy for dizziness and neck pain, as well as a follow-up with me in 6 weeks if needed.  You are still early post-injury and I expect you to continue to recover.    Please go directly from here to the emergency room for your leg pain and weakness.  I will call ahead to let them know you are coming.
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
## 2018-09-15 ENCOUNTER — APPOINTMENT (OUTPATIENT)
Dept: CT IMAGING | Facility: CLINIC | Age: 43
End: 2018-09-15
Attending: EMERGENCY MEDICINE
Payer: COMMERCIAL

## 2018-09-15 ENCOUNTER — APPOINTMENT (OUTPATIENT)
Dept: GENERAL RADIOLOGY | Facility: CLINIC | Age: 43
End: 2018-09-15
Attending: EMERGENCY MEDICINE
Payer: COMMERCIAL

## 2018-09-15 ENCOUNTER — APPOINTMENT (OUTPATIENT)
Dept: MRI IMAGING | Facility: CLINIC | Age: 43
End: 2018-09-15
Attending: EMERGENCY MEDICINE
Payer: COMMERCIAL

## 2018-09-15 ENCOUNTER — HOSPITAL ENCOUNTER (EMERGENCY)
Facility: CLINIC | Age: 43
Discharge: HOME | End: 2018-09-15
Attending: EMERGENCY MEDICINE | Admitting: EMERGENCY MEDICINE
Payer: COMMERCIAL

## 2018-09-15 VITALS
SYSTOLIC BLOOD PRESSURE: 112 MMHG | DIASTOLIC BLOOD PRESSURE: 85 MMHG | BODY MASS INDEX: 27.96 KG/M2 | RESPIRATION RATE: 16 BRPM | HEART RATE: 74 BPM | TEMPERATURE: 97.3 F | OXYGEN SATURATION: 100 % | WEIGHT: 168 LBS

## 2018-09-15 DIAGNOSIS — M79.604 BILATERAL LEG PAIN: ICD-10-CM

## 2018-09-15 DIAGNOSIS — M25.551 HIP PAIN, RIGHT: ICD-10-CM

## 2018-09-15 DIAGNOSIS — M79.605 BILATERAL LEG PAIN: ICD-10-CM

## 2018-09-15 LAB
ALBUMIN UR-MCNC: NEGATIVE MG/DL
ANION GAP SERPL CALCULATED.3IONS-SCNC: 8 MMOL/L (ref 3–14)
APPEARANCE UR: CLEAR
BASOPHILS # BLD AUTO: 0 10E9/L (ref 0–0.2)
BASOPHILS NFR BLD AUTO: 0.2 %
BILIRUB UR QL STRIP: NEGATIVE
BUN SERPL-MCNC: 20 MG/DL (ref 7–30)
CALCIUM SERPL-MCNC: 8.8 MG/DL (ref 8.5–10.1)
CHLORIDE SERPL-SCNC: 103 MMOL/L (ref 94–109)
CO2 SERPL-SCNC: 28 MMOL/L (ref 20–32)
COLOR UR AUTO: ABNORMAL
CREAT SERPL-MCNC: 1.18 MG/DL (ref 0.66–1.25)
DIFFERENTIAL METHOD BLD: NORMAL
EOSINOPHIL # BLD AUTO: 0.2 10E9/L (ref 0–0.7)
EOSINOPHIL NFR BLD AUTO: 1.8 %
ERYTHROCYTE [DISTWIDTH] IN BLOOD BY AUTOMATED COUNT: 13.1 % (ref 10–15)
GFR SERPL CREATININE-BSD FRML MDRD: 67 ML/MIN/1.7M2
GLUCOSE SERPL-MCNC: 97 MG/DL (ref 70–99)
GLUCOSE UR STRIP-MCNC: NEGATIVE MG/DL
HCT VFR BLD AUTO: 43.6 % (ref 40–53)
HGB BLD-MCNC: 14.6 G/DL (ref 13.3–17.7)
HGB UR QL STRIP: NEGATIVE
IMM GRANULOCYTES # BLD: 0.1 10E9/L (ref 0–0.4)
IMM GRANULOCYTES NFR BLD: 0.8 %
KETONES UR STRIP-MCNC: NEGATIVE MG/DL
LEUKOCYTE ESTERASE UR QL STRIP: NEGATIVE
LYMPHOCYTES # BLD AUTO: 3.2 10E9/L (ref 0.8–5.3)
LYMPHOCYTES NFR BLD AUTO: 34.1 %
MCH RBC QN AUTO: 29.9 PG (ref 26.5–33)
MCHC RBC AUTO-ENTMCNC: 33.5 G/DL (ref 31.5–36.5)
MCV RBC AUTO: 89 FL (ref 78–100)
MONOCYTES # BLD AUTO: 0.7 10E9/L (ref 0–1.3)
MONOCYTES NFR BLD AUTO: 7.6 %
MUCOUS THREADS #/AREA URNS LPF: PRESENT /LPF
NEUTROPHILS # BLD AUTO: 5.2 10E9/L (ref 1.6–8.3)
NEUTROPHILS NFR BLD AUTO: 55.5 %
NITRATE UR QL: NEGATIVE
NRBC # BLD AUTO: 0 10*3/UL
NRBC BLD AUTO-RTO: 0 /100
PH UR STRIP: 5.5 PH (ref 5–7)
PLATELET # BLD AUTO: 427 10E9/L (ref 150–450)
POTASSIUM SERPL-SCNC: 4.2 MMOL/L (ref 3.4–5.3)
RBC # BLD AUTO: 4.89 10E12/L (ref 4.4–5.9)
RBC #/AREA URNS AUTO: <1 /HPF (ref 0–2)
SODIUM SERPL-SCNC: 139 MMOL/L (ref 133–144)
SOURCE: ABNORMAL
SP GR UR STRIP: 1.01 (ref 1–1.03)
SQUAMOUS #/AREA URNS AUTO: <1 /HPF (ref 0–1)
UROBILINOGEN UR STRIP-MCNC: NORMAL MG/DL (ref 0–2)
WBC # BLD AUTO: 9.3 10E9/L (ref 4–11)
WBC #/AREA URNS AUTO: 0 /HPF (ref 0–5)

## 2018-09-15 PROCEDURE — 73700 CT LOWER EXTREMITY W/O DYE: CPT | Mod: RT

## 2018-09-15 PROCEDURE — 99284 EMERGENCY DEPT VISIT MOD MDM: CPT | Mod: Z6 | Performed by: EMERGENCY MEDICINE

## 2018-09-15 PROCEDURE — 80048 BASIC METABOLIC PNL TOTAL CA: CPT | Performed by: EMERGENCY MEDICINE

## 2018-09-15 PROCEDURE — 81001 URINALYSIS AUTO W/SCOPE: CPT | Performed by: EMERGENCY MEDICINE

## 2018-09-15 PROCEDURE — 73552 X-RAY EXAM OF FEMUR 2/>: CPT | Mod: RT

## 2018-09-15 PROCEDURE — 85025 COMPLETE CBC W/AUTO DIFF WBC: CPT | Performed by: EMERGENCY MEDICINE

## 2018-09-15 PROCEDURE — 72148 MRI LUMBAR SPINE W/O DYE: CPT

## 2018-09-15 PROCEDURE — 99285 EMERGENCY DEPT VISIT HI MDM: CPT | Mod: 25 | Performed by: EMERGENCY MEDICINE

## 2018-09-15 RX ORDER — CYCLOBENZAPRINE HCL 10 MG
10 TABLET ORAL 3 TIMES DAILY PRN
Qty: 20 TABLET | Refills: 0 | Status: SHIPPED | OUTPATIENT
Start: 2018-09-15 | End: 2018-09-21

## 2018-09-15 ASSESSMENT — ENCOUNTER SYMPTOMS
NUMBNESS: 0
ABDOMINAL PAIN: 0
VOMITING: 1
NAUSEA: 1
CHILLS: 1

## 2018-09-15 ASSESSMENT — PATIENT HEALTH QUESTIONNAIRE - PHQ9: SUM OF ALL RESPONSES TO PHQ QUESTIONS 1-9: 8

## 2018-09-15 ASSESSMENT — ANXIETY QUESTIONNAIRES: GAD7 TOTAL SCORE: 7
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
## 2018-09-15 NOTE — ED AVS SNAPSHOT
[facility] [place], Emergency Department    [address]    [place] [address]    Phone:  [phone]    Fax:  [phone]                                       Paul Godfrey   MRN: [id number]    Department:  [facility], Emergency Department   Date of Visit:  9/15/2018           After Visit Summary Signature Page     I have received my discharge instructions, and my questions have been answered. I have discussed any challenges I see with this plan with the nurse or doctor.    ..........................................................................................................................................  Patient/Patient Representative Signature      ..........................................................................................................................................  Patient Representative Print Name and Relationship to Patient    ..................................................               ................................................  Date                                   Time    ..........................................................................................................................................  Reviewed by Signature/Title    ...................................................              ..............................................  Date                                               Time          22EPIC Rev 08/18

## 2018-09-16 ASSESSMENT — ENCOUNTER SYMPTOMS
FEVER: 0
WEAKNESS: 1
BACK PAIN: 0
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
## 2018-09-20 ENCOUNTER — OFFICE VISIT (OUTPATIENT)
Dept: FAMILY MEDICINE | Facility: CLINIC | Age: 43
End: 2018-09-20
Payer: COMMERCIAL

## 2018-09-20 VITALS
TEMPERATURE: 97.7 F | HEART RATE: 85 BPM | SYSTOLIC BLOOD PRESSURE: 126 MMHG | DIASTOLIC BLOOD PRESSURE: 80 MMHG | BODY MASS INDEX: 27.49 KG/M2 | OXYGEN SATURATION: 98 % | WEIGHT: 165.2 LBS | RESPIRATION RATE: 16 BRPM

## 2018-09-20 DIAGNOSIS — S02.40FD CLOSED FRACTURE OF LEFT ZYGOMATIC ARCH WITH ROUTINE HEALING, SUBSEQUENT ENCOUNTER: Primary | ICD-10-CM

## 2018-09-20 DIAGNOSIS — S70.01XD CONTUSION OF RIGHT HIP, SUBSEQUENT ENCOUNTER: ICD-10-CM

## 2018-09-20 DIAGNOSIS — S80.01XD CONTUSION OF RIGHT KNEE, SUBSEQUENT ENCOUNTER: ICD-10-CM

## 2018-09-20 DIAGNOSIS — M54.50 ACUTE MIDLINE LOW BACK PAIN WITHOUT SCIATICA: ICD-10-CM

## 2018-09-20 RX ORDER — CYCLOBENZAPRINE HCL 10 MG
10 TABLET ORAL 3 TIMES DAILY PRN
Qty: 90 TABLET | Refills: 0 | Status: SHIPPED | OUTPATIENT
Start: 2018-09-20 | End: 2018-10-24
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
## 2018-10-03 ENCOUNTER — PRE VISIT (OUTPATIENT)
Dept: OTOLARYNGOLOGY | Facility: CLINIC | Age: 43
End: 2018-10-03

## 2018-10-24 ENCOUNTER — OFFICE VISIT (OUTPATIENT)
Dept: FAMILY MEDICINE | Facility: CLINIC | Age: 43
End: 2018-10-24
Payer: COMMERCIAL

## 2018-10-24 VITALS
DIASTOLIC BLOOD PRESSURE: 65 MMHG | WEIGHT: 157.4 LBS | SYSTOLIC BLOOD PRESSURE: 99 MMHG | BODY MASS INDEX: 26.19 KG/M2 | OXYGEN SATURATION: 99 % | TEMPERATURE: 97.7 F | HEART RATE: 103 BPM

## 2018-10-24 DIAGNOSIS — F07.81 POSTCONCUSSION SYNDROME: Primary | ICD-10-CM

## 2018-10-24 RX ORDER — CLONIDINE HYDROCHLORIDE 0.1 MG/1
0.1 TABLET ORAL 3 TIMES DAILY
Qty: 60 TABLET | COMMUNITY
Start: 2018-10-24

## 2018-10-24 NOTE — PATIENT INSTRUCTIONS
Here is the plan from today's visit    1. Postconcussion syndrome  They will call you for appointment  I will also refer for maxollofacial for your fracture, they should call you as well  - CONCUSSION  REFERRAL    Please call or return to clinic if your symptoms don't go away.    Follow up plan  Please make a clinic appointment for follow up with me (AMADOR MENA) in one month.    Thank you for coming to [facility] today.  Lab Testing:  **If you had lab testing today and your results are reassuring or normal they will be mailed to you or sent through [employer] within 7 days.   **If the lab tests need quick action we will call you with the results.  The phone number we will call with results is # [phone] (home) none (work). If this is not the best number please call our clinic and change the number.  Medication Refills:  If you need any refills please call your pharmacy and they will contact us.   If you need to  your refill at a new pharmacy, please contact the new pharmacy directly. The new pharmacy will help you get your medications transferred faster.   Scheduling:  If you have any concerns about today's visit or wish to schedule another appointment please call our office during normal business hours [phone] (8-5:00 M-F)  If a referral was made to a [facility] and you don't get a call from central scheduling please call [phone].  If a Mammogram was ordered for you at [facility] call [phone] to schedule or change your appointment.  If you had an XRay/CT/Ultrasound/MRI ordered the number is [phone] to schedule or change your radiology appointment.   Medical Concerns:  If you have urgent medical concerns please call [phone] at any time of the day.    Amador Mena MD        Healing After a Concussion     Rest  Rest is the best treatment for a concussion. You should avoid activities that cause your symptoms to get worse or make  you feel tired. This would include physical activities as well as watching TV, texting or playing video games.    You may sleep or nap during the day as long as it does not prevent you from sleeping at night. If you find it is hard to fall asleep, talk to your doctor. You may need medicine to help you sleep.    If symptoms have not worsened, you do not need to be wakened and checked on during the night.      School  You can rest your brain by staying at home for a time. The amount of time away from school will depend on the injury and the symptoms.    At school, you may have trouble taking tests or working on a computer. Symptoms may get worse in band, choir, busy classes or a noisy lunchroom. A doctor can work with the school if you need a plan to help you succeed.    Work  You may need to change your work routine as you recover. A doctor can help you create a plan for the conditions at your job.      Treat pain    Take Tylenol (acetaminophen) for headaches and pain every 4 to 6 hours, as needed.    Do not take over-the-counter medicines such as ibuprofen, Advil, Motrin, Benadryl, Aleve, sleep aides or Tylenol PM. These drugs may cause new problems.    If you cannot manage your pain with Tylenol, call your doctor or go to the emergency department.      Watch symptoms closely  Each day keep track of your symptoms. This will help your doctor see how well you are healing. Write down the symptom, how often it occurs, how long it lasts, and what makes it better or worse.    Possible symptoms: headache, stomach upset, feeling confused or dizzy, motion sickness, and personality changes.      Returning to activity  Take your time returning to activity. A doctor can help determine what levels of activity are best for you. If you re returning to a sport, you should see a healthcare provider before doing so.      If you have questions, call  Concussion hotline: 656.704.1572 or Athletic medicine hotline:  "673.734.4497.          For informational purposes only.  Not to replace the advice of your health care provider.  Copyright   2014 [facility].  All rights reserved.            Sleep Hygiene     What is it?    \"Sleep hygiene\" means having good sleep habits. Follow the tips below to sleep better at night.      Get on a schedule. Go to bed and get up at about the same time every day.    Listen to your body. Only try to sleep when you actually feel tired or sleepy.    Be patient. If you haven't been able to get to sleep after about 20 minutes or more, get up and do something calming or boring until you feel sleepy. Then, return to bed and try again.      Avoid caffeine (coffee, tea, cola drinks, chocolate and some medicines) for at least 4 to 6 hours before going to bed. We also suggest you don't use alcohol or nicotine (cigarettes) during this time. Both can make it harder for you to fall asleep and stay asleep.    Use your bed for sleeping only. That means no TV, computer or homework in bed!    Don't nap during the day. If you do nap, make sure it is for less than an hour and before 3 p.m.    Create sleep rituals that remind your body that it is time to sleep. Examples include breathing exercises, stretching, or reading a book.     Try a bath or shower before bed. Having a hot bath 1 to 2 hours before bedtime can help you feel sleepy.    Don't watch the clock. Checking the clock during the night can wake you up. It can also lead to negative thoughts such as \"I will never fall asleep.\"    Use a sleep diary. Track your sleep schedule to know your sleep patterns and to see where you can improve.    Get regular exercise. But try not to do heavy exercise in the 4 hours before bedtime.      Eat a healthy, balanced diet. Try eating a light, healthy snack before bed, but avoid eating a heavy meal.    Create the right sleeping area. A cool, dark, quiet room is best. If needed, try earplugs, fans and blackout " curtains.      Keep your daytime routine the same even if you have a bad night sleep. Avoiding activities the next day can make it harder to sleep.          For informational purposes only. Not to replace the advice of your health care provider. Copyright   2013 [employer]. All rights reserved.

## 2018-10-24 NOTE — PROGRESS NOTES
Paul Godfrey is a 43 year old male who presents in follow up for a Postconcussion syndrome that occurred on 9/7/18.    Was late today to appointment, performed as much concussion follow up testing as able.    Had zygomatic arch fracture, CT head with no acute intracranial concerns.  Had follow up CT on 9/15/18 with no changes.  Had MRI lumbar and hip CT and xray as well at that time with no acute concerns.    Substance use disorder: history of methamphetamine, cocaine, marijuana, in remission since July 2018    Has [facility] and case management, Dr. Arguelles for psychiatry, staring therapy again. Currently going to NA meetings 1 x a week    Since last visit:     Since your last visit, level of activity is:  Stage 1 - very light    Reports memory issues, difficulty focusing  Also reports has now developed stutter    Mood: Labile, reports no SI, no current hallucinations or psychosis (last time was when taking cymbalta, stopped as per psychiatrist recommendation last week and has improved significantly since).    Since your last visit, have you continued with your normal cognitive activity (text, computer, school):  Unable to focus, difficulty reading, using screens/computer      Current Symptoms:  CONCUSSION SYMPTOMS ASSESSMENT 9/14/2018   Headache or Pressure In Head 1 - mild   Upset Stomach or Throwing Up 0 - none   Problems with Balance 5 - severe   Feeling Dizzy 3 - moderate   Sensitivity to Light 2 - mild to moderate   Sensitivity to Noise 5 - severe   Mood Changes 3 - moderate   Feeling sluggish, hazy, or foggy 5 - severe   Trouble Concentrating, Lack of Focus 2 - mild to moderate   Motion Sickness 0 - none   Vision Changes 4 - moderate to severe   Memory Problems 5 - severe   Feeling Confused 3 - moderate   Neck Pain 0 - none   Trouble Sleeping 6 - excruciating   Total Number of Symptoms 12   Symptom Severity Score 44       Sleep: Difficulty falling asleep    Patient's past medical, surgical, social and family  histories are reviewed today.    Past Medical History:   Diagnosis Date     Asthma     No Comments Provided     Hearing loss     secondary spinal meningitis     Past Surgical History:   Procedure Laterality Date     ENT SURGERY      right shoulder     OTHER SURGICAL HISTORY      [id number],IR LUMBAR PUNCTURE DIAGNOSTIC       OBJECTIVE:  BP 99/65  Pulse 103  Temp 97.7  F (36.5  C) (Oral)  Wt 157 lb 6.4 oz (71.4 kg)  SpO2 99%  BMI 26.19 kg/m2    General: Healthy, well-appearing, and in no acute distress.  Skin: no suspicious lesions or rashes  Psych: mentation appears normal, and affect is appropriate/bright  HEENT: Neck is supple with full ROM; initial exam benign.  Neuromuscular/Strength: Full strength of all neck muscles; no motor weakness in C5-T1 distribution.    Neurologic/Visual:  Visual field testing: normal  ZACH: yes  EOMI: yes  Nystagmus: no  Painful eye movements: no  Convergence testing: Normal (</= 6 cm)    Neurovestibular:  Head Still eyes move side to side: no nystagmus, no headache, no dizziness and no nausea  Head still eyes move up and down: no nystagmus, no headache, no dizziness and no nausea  Eyes fixed head moves side to side: no nystagmus, no headache, no dizziness and no nausea    Coordination:       - Finger to Nose: normal         - Rapid Alternating Movements: normal    Balance Testing:       - Romberg: normal            - Single-leg stance: normal      Walk in hallway at normal speed: Able     Cognitive:  Previous cognitive assessment was normal and without deficit; repeat cognitive testing was partial today as detailed in note due to late arrival    Immediate object recall:   4 Object Recall at 5 minutes:  Reverse months of the year:   Spell world backwards: Able  Backwards number strin numbers   4-9-3                  Alternate:  6-2-9   3-8-1-4   3-2-7-9    6-2-9-7-1   1-5-2-8-6    7-1-8-4-6-2   5-3-9-1-4-8       Impact Testing Scores: ImPACT Testing not  performed    ASSESSMENT:  Postconcussion syndrome    PLAN:  Remains symptomatic as noted above.  Not cleared to return to physical activity.  Discussed modified attendance at work/school as necessary.  Reviewed what activities to avoid, as well as worrisome signs, symptoms, and reasons to go to the ED.  Return in 4 weeks for re-evaluation.  Referred to concussion clinic for further evaluation and rehabiliation.  - Primary concerns are subjective memory and focus, newly developed stutter.  On testing today no concerning memory, cognition, or physical findings

## 2018-10-24 NOTE — MR AVS SNAPSHOT
After Visit Summary   10/24/2018    Paul Godfrey    MRN: [id number]           Patient Information     Date Of Birth          1975        Visit Information        Provider Department      10/24/2018 8:20 AM Amador Leon MD [facility]        Today's Diagnoses     Postconcussion syndrome    -  1      Care Instructions    Here is the plan from today's visit    1. Postconcussion syndrome  They will call you for appointment  I will also refer for maxollofacial for your fracture, they should call you as well  - CONCUSSION  REFERRAL    Please call or return to clinic if your symptoms don't go away.    Follow up plan  Please make a clinic appointment for follow up with me (AMADOR LEON) in one month.    Thank you for coming to [facility] today.  Lab Testing:  **If you had lab testing today and your results are reassuring or normal they will be mailed to you or sent through [employer] within 7 days.   **If the lab tests need quick action we will call you with the results.  The phone number we will call with results is # [phone] (home) none (work). If this is not the best number please call our clinic and change the number.  Medication Refills:  If you need any refills please call your pharmacy and they will contact us.   If you need to  your refill at a new pharmacy, please contact the new pharmacy directly. The new pharmacy will help you get your medications transferred faster.   Scheduling:  If you have any concerns about today's visit or wish to schedule another appointment please call our office during normal business hours [phone] (8-5:00 M-F)  If a referral was made to a [facility] and you don't get a call from central scheduling please call [phone].  If a Mammogram was ordered for you at [facility] call [phone] to schedule or change your appointment.  If you had an XRay/CT/Ultrasound/MRI ordered  the number is [phone] to schedule or change your radiology appointment.   Medical Concerns:  If you have urgent medical concerns please call [phone] at any time of the day.    Amador Mena MD        Healing After a Concussion     Rest  Rest is the best treatment for a concussion. You should avoid activities that cause your symptoms to get worse or make you feel tired. This would include physical activities as well as watching TV, texting or playing video games.    You may sleep or nap during the day as long as it does not prevent you from sleeping at night. If you find it is hard to fall asleep, talk to your doctor. You may need medicine to help you sleep.    If symptoms have not worsened, you do not need to be wakened and checked on during the night.      School  You can rest your brain by staying at home for a time. The amount of time away from school will depend on the injury and the symptoms.    At school, you may have trouble taking tests or working on a computer. Symptoms may get worse in band, choir, busy classes or a noisy lunchroom. A doctor can work with the school if you need a plan to help you succeed.    Work  You may need to change your work routine as you recover. A doctor can help you create a plan for the conditions at your job.      Treat pain    Take Tylenol (acetaminophen) for headaches and pain every 4 to 6 hours, as needed.    Do not take over-the-counter medicines such as ibuprofen, Advil, Motrin, Benadryl, Aleve, sleep aides or Tylenol PM. These drugs may cause new problems.    If you cannot manage your pain with Tylenol, call your doctor or go to the emergency department.      Watch symptoms closely  Each day keep track of your symptoms. This will help your doctor see how well you are healing. Write down the symptom, how often it occurs, how long it lasts, and what makes it better or worse.    Possible symptoms: headache, stomach upset, feeling confused or dizzy, motion  "sickness, and personality changes.      Returning to activity  Take your time returning to activity. A doctor can help determine what levels of activity are best for you. If you re returning to a sport, you should see a healthcare provider before doing so.      If you have questions, call  Concussion hotline: 749.854.2768 or Athletic medicine hotline: 900.589.7652.          For informational purposes only.  Not to replace the advice of your health care provider.  Copyright   2014 [facility].  All rights reserved.            Sleep Hygiene     What is it?    \"Sleep hygiene\" means having good sleep habits. Follow the tips below to sleep better at night.      Get on a schedule. Go to bed and get up at about the same time every day.    Listen to your body. Only try to sleep when you actually feel tired or sleepy.    Be patient. If you haven't been able to get to sleep after about 20 minutes or more, get up and do something calming or boring until you feel sleepy. Then, return to bed and try again.      Avoid caffeine (coffee, tea, cola drinks, chocolate and some medicines) for at least 4 to 6 hours before going to bed. We also suggest you don't use alcohol or nicotine (cigarettes) during this time. Both can make it harder for you to fall asleep and stay asleep.    Use your bed for sleeping only. That means no TV, computer or homework in bed!    Don't nap during the day. If you do nap, make sure it is for less than an hour and before 3 p.m.    Create sleep rituals that remind your body that it is time to sleep. Examples include breathing exercises, stretching, or reading a book.     Try a bath or shower before bed. Having a hot bath 1 to 2 hours before bedtime can help you feel sleepy.    Don't watch the clock. Checking the clock during the night can wake you up. It can also lead to negative thoughts such as \"I will never fall asleep.\"    Use a sleep diary. Track your sleep schedule to know your sleep " patterns and to see where you can improve.    Get regular exercise. But try not to do heavy exercise in the 4 hours before bedtime.      Eat a healthy, balanced diet. Try eating a light, healthy snack before bed, but avoid eating a heavy meal.    Create the right sleeping area. A cool, dark, quiet room is best. If needed, try earplugs, fans and blackout curtains.      Keep your daytime routine the same even if you have a bad night sleep. Avoiding activities the next day can make it harder to sleep.          For informational purposes only. Not to replace the advice of your health care provider. Copyright   2013 [facility]. All rights reserved.            Follow-ups after your visit        Additional Services     CONCUSSION  REFERRAL       You have been referred to [facility]  service.    The  Representative will assist you in the coordination of your concussion care as prescribed by your provider.    The  Representative will contact you within one business day, or you may contact the  Representative at [phone].    Referral Options:  Non-Sports related concussion management    Coverage of these services are subject to the terms and limitations of your health insurance plan.  Please call member services at your health plan with any benefit or coverage questions.     If X-rays, CT or MRI's have been performed, please contact the facility where they were done, to arrange for  prior to your scheduled appointment.  Please bring this referral request to your appointment and present it to your specialist.                  Who to contact     Please call your clinic at [phone] to:    Ask questions about your health    Make or cancel appointments    Discuss your medicines    Learn about your test results    Speak to your doctor            Additional Information About Your Visit        [employer] Information     [employer] gives you secure access  to your electronic health record. If you see a primary care provider, you can also send messages to your care team and make appointments. If you have questions, please call your primary care clinic.  If you do not have a primary care provider, please call [phone] and they will assist you.      [employer] is an electronic gateway that provides easy, online access to your medical records. With [employer], you can request a clinic appointment, read your test results, renew a prescription or communicate with your care team.     To access your existing account, please contact your [facility] or call [phone] for assistance.        Care EveryWhere ID     This is your Care EveryWhere ID. This could be used by other organizations to access your [facility] medical records  [id number]        Your Vitals Were     Pulse Temperature Pulse Oximetry BMI (Body Mass Index)          103 97.7  F (36.5  C) (Oral) 99% 26.19 kg/m2         Blood Pressure from Last 3 Encounters:   10/24/18 99/65   09/20/18 126/80   09/15/18 112/85    Weight from Last 3 Encounters:   10/24/18 157 lb 6.4 oz (71.4 kg)   09/20/18 165 lb 3.2 oz (74.9 kg)   09/15/18 168 lb (76.2 kg)              We Performed the Following     CONCUSSION  REFERRAL          Today's Medication Changes          These changes are accurate as of 10/24/18  9:26 AM.  If you have any questions, ask your nurse or doctor.               These medicines have changed or have updated prescriptions.        Dose/Directions    cloNIDine 0.1 MG tablet   Commonly known as:  CATAPRES   This may have changed:  when to take this   Changed by:  Amador Mena MD        Dose:  0.1 mg   Take 1 tablet (0.1 mg) by mouth 3 times daily   Quantity:  60 tablet   Refills:  0       QUEtiapine 100 MG tablet   Commonly known as:  SEROquel   This may have changed:  Another medication with the same name was removed. Continue taking this medication, and follow the  directions you see here.   Changed by:  Amador Mena MD        Takes 100 mg Q AM, and Noon.  Takes 400 mg Q HS - Total of 600 mg/day   Refills:  0         Stop taking these medicines if you haven't already. Please contact your care team if you have questions.     cyclobenzaprine 10 MG tablet   Commonly known as:  FLEXERIL   Stopped by:  Amador Mena MD           DULoxetine 20 MG EC capsule   Commonly known as:  CYMBALTA   Stopped by:  Amador Mena MD           HYDROcodone-acetaminophen 5-325 MG per tablet   Commonly known as:  NORCO   Stopped by:  Amador Mena MD                    Primary Care Provider Office Phone # Fax #    Amador Mena [phone]       [facility] [address] [place] [address]        Equal Access to Services     THOR ÁLVAREZ : Hadii nancy rollins hadasho Soomaali, waaxda luqadaha, qaybta kaalmada adeegyada, waxay ramya haysyed ortega . So [facility] [phone].    ATENCIÓN: Si habla español, tiene a christianson disposición servicios gratuitos de asistencia lingüística. Llame al [phone].    We comply with applicable federal civil rights laws and [place] laws. We do not discriminate on the basis of race, color, national origin, age, disability, sex, sexual orientation, or gender identity.            Thank you!     Thank you for choosing [facility]  for your care. Our goal is always to provide you with excellent care. Hearing back from our patients is one way we can continue to improve our services. Please take a few minutes to complete the written survey that you may receive in the mail after your visit with us. Thank you!             Your Updated Medication List - Protect others around you: Learn how to safely use, store and throw away your medicines at www.disposemymeds.org.          This list is accurate as of 10/24/18  9:26 AM.  Always use your most recent med list.                   Brand  Name Dispense Instructions for use Diagnosis    albuterol 108 (90 Base) MCG/ACT inhaler    PROAIR HFA/PROVENTIL HFA/VENTOLIN HFA    1 Inhaler    Inhale 2 puffs into the lungs every 4 hours as needed for shortness of breath / dyspnea or wheezing    Intermittent asthma, uncomplicated       buPROPion 300 MG 24 hr tablet    WELLBUTRIN XL     Take 300 mg by mouth        celecoxib 200 MG capsule    celeBREX    60 capsule    Take 1 capsule (200 mg) by mouth 2 times daily as needed for moderate pain    Closed fracture of left zygomatic arch with routine healing, subsequent encounter       cloNIDine 0.1 MG tablet    CATAPRES    60 tablet    Take 1 tablet (0.1 mg) by mouth 3 times daily        lamoTRIgine 25 MG tablet    LaMICtal     Take 25 mg by mouth        QUEtiapine 100 MG tablet    SEROquel     Takes 100 mg Q AM, and Noon.  Takes 400 mg Q HS - Total of 600 mg/day        traZODone 100 MG tablet    DESYREL     Take 100 mg by mouth

## 2018-10-24 NOTE — PROGRESS NOTES
Preceptor Attestation:   Patient seen, evaluated and discussed with the resident. I have verified the content of the note, which accurately reflects my assessment of the patient and the plan of care.   Supervising Physician:  Kiki Danielle MD

## 2018-10-25 ASSESSMENT — ASTHMA QUESTIONNAIRES: ACT_TOTALSCORE: 11

## 2018-11-01 PROBLEM — F07.81 POSTCONCUSSION SYNDROME: Status: ACTIVE | Noted: 2018-11-01

## 2018-11-23 DIAGNOSIS — F33.9 RECURRENT MAJOR DEPRESSIVE DISORDER, REMISSION STATUS UNSPECIFIED (H): Primary | ICD-10-CM

## 2018-11-23 RX ORDER — BUPROPION HYDROCHLORIDE 300 MG/1
300 TABLET ORAL EVERY MORNING
Qty: 90 TABLET | Refills: 1 | Status: SHIPPED | OUTPATIENT
Start: 2018-11-23

## 2018-11-23 NOTE — TELEPHONE ENCOUNTER

## 2019-01-01 ENCOUNTER — HOSPITAL ENCOUNTER (EMERGENCY)
Facility: CLINIC | Age: 44
Discharge: HOME | End: 2019-01-01
Attending: EMERGENCY MEDICINE | Admitting: EMERGENCY MEDICINE

## 2019-01-01 VITALS
BODY MASS INDEX: 27.49 KG/M2 | WEIGHT: 165 LBS | HEIGHT: 65 IN | DIASTOLIC BLOOD PRESSURE: 85 MMHG | SYSTOLIC BLOOD PRESSURE: 136 MMHG | RESPIRATION RATE: 18 BRPM | OXYGEN SATURATION: 95 % | TEMPERATURE: 98.5 F

## 2019-01-01 DIAGNOSIS — F99 PSYCHIATRIC SYMPTOMS: ICD-10-CM

## 2019-01-01 LAB — INTERPRETATION ECG - MUSE: NORMAL

## 2019-01-01 PROCEDURE — 90791 PSYCH DIAGNOSTIC EVALUATION: CPT

## 2019-01-01 PROCEDURE — 99285 EMERGENCY DEPT VISIT HI MDM: CPT | Mod: 25

## 2019-01-01 ASSESSMENT — ENCOUNTER SYMPTOMS
SLEEP DISTURBANCE: 1
HALLUCINATIONS: 1

## 2019-01-01 ASSESSMENT — MIFFLIN-ST. JEOR: SCORE: 1560.32

## 2019-01-01 NOTE — ED AVS SNAPSHOT
Emergency Department  [address] [place] [id number]  Phone:  [phone]  Fax:  [phone]                                    Paul Godfrey   MRN: [id number]    Department:   Emergency Department   Date of Visit:  1/1/2019           After Visit Summary Signature Page    I have received my discharge instructions, and my questions have been answered. I have discussed any challenges I see with this plan with the nurse or doctor.    ..........................................................................................................................................  Patient/Patient Representative Signature      ..........................................................................................................................................  Patient Representative Print Name and Relationship to Patient    ..................................................               ................................................  Date                                   Time    ..........................................................................................................................................  Reviewed by Signature/Title    ...................................................              ..............................................  Date                                               Time          22EPIC Rev 08/18
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
## 2019-01-01 NOTE — ED NOTES
Bed: ED16  Expected date: 1/1/19  Expected time: 2:55 AM  Means of arrival: Ambulance  Comments:  [address] 43M crisis eval; in PD custody

## 2019-01-09 ENCOUNTER — OFFICE VISIT (OUTPATIENT)
Dept: FAMILY MEDICINE | Facility: CLINIC | Age: 44
End: 2019-01-09

## 2019-01-09 VITALS
HEART RATE: 80 BPM | DIASTOLIC BLOOD PRESSURE: 82 MMHG | SYSTOLIC BLOOD PRESSURE: 132 MMHG | TEMPERATURE: 98 F | WEIGHT: 160.6 LBS | OXYGEN SATURATION: 100 % | BODY MASS INDEX: 26.73 KG/M2

## 2019-01-09 DIAGNOSIS — F39 MOOD DISORDER (H): ICD-10-CM

## 2019-01-09 DIAGNOSIS — R30.0 DYSURIA: Primary | ICD-10-CM

## 2019-01-09 DIAGNOSIS — F15.21 AMPHETAMINE SUBSTANCE USE DISORDER, MODERATE, IN EARLY REMISSION (H): ICD-10-CM

## 2019-01-09 LAB
AMPHETAMINES QUAL: NEGATIVE
BARBITURATES QUAL URINE: NEGATIVE
BENZODIAZEPINE QUAL URINE: NEGATIVE
BILIRUBIN UR: NEGATIVE
BLOOD UR: NEGATIVE
BUPRENORPHINE QUAL URINE: NEGATIVE
CANNABINOIDS UR QL SCN: NEGATIVE
COCAINE QUAL URINE: POSITIVE
GLUCOSE URINE: NEGATIVE
KETONES UR QL: NEGATIVE
LEUKOCYTE ESTERASE UR: NEGATIVE
METHAMPHETAMINE: NEGATIVE
METHODONE QUAL: NEGATIVE
MORPHINE QUAL: NEGATIVE
NITRITE UR QL STRIP: NEGATIVE
OXYCODONE QUAL: NEGATIVE
PH UR STRIP: 6.5 [PH] (ref 5–7)
PHENCYCLIDINE: NEGATIVE
PROPOXYPHENE: NEGATIVE
PROTEIN UR: NEGATIVE
SP GR UR STRIP: 1.02
TEMPERATURE OF URINE WAS BETWEEN 90-100 DEGREES F: YES
TRICYCLIC ANTIDEPRESSANTS: NEGATIVE
UROBILINOGEN UR STRIP-ACNC: NORMAL

## 2019-01-09 RX ORDER — AZITHROMYCIN 250 MG/1
1000 TABLET, FILM COATED ORAL ONCE
Qty: 6 TABLET | Refills: 0 | Status: SHIPPED | OUTPATIENT
Start: 2019-01-09 | End: 2019-04-18

## 2019-01-09 ASSESSMENT — ENCOUNTER SYMPTOMS
COUGH: 0
FREQUENCY: 0
ABDOMINAL DISTENTION: 0
NAUSEA: 0
HEADACHES: 0
FLANK PAIN: 0
DECREASED CONCENTRATION: 1
APPETITE CHANGE: 0
NERVOUS/ANXIOUS: 1
NECK PAIN: 0
UNEXPECTED WEIGHT CHANGE: 0
SLEEP DISTURBANCE: 1
CHILLS: 0
DYSURIA: 1
BACK PAIN: 0
MYALGIAS: 0
SHORTNESS OF BREATH: 0
ABDOMINAL PAIN: 0
ARTHRALGIAS: 0
FEVER: 0
DIFFICULTY URINATING: 0
DIARRHEA: 0

## 2019-01-09 NOTE — PROGRESS NOTES
HPI       Paul Godfrey is a 43 year old  who presents for   Chief Complaint   Patient presents with     UTI     RECHECK     mental health     PMHx substance abuse, MDD,anxiety,  recent cocaine use and drug related psychosis who presents for        Prior medicines   Wellbutrin, clonidine, seroquel, lamictal pyschiatry managing [facility]         Mental Health   insurance ended, now has turned back on 1 week ago.   - off mental health meds now. Couldn't work w case management as well when he lost insurance .   - insurance lapped bc sent to other house and couldn't log in online.   - [place]  now  - Called Dr Smith who only filled wellbutrin and trazodone; psychiatry with [facility] coming up on 14th. Missing clonidine, seroquel and lamictal.   - lamictal makes him very sick if he doesn't get it and bc he has lapses were he doesn't get it he wants to be on something different.   wellbutrin ran out first week dec, then lamictal, then seroquel, then clonidine.     Dysuria   Tingling sensation even when not urinating at the tip of his penis. Doesn't necessarily burn when he pees but noticed 1 day of discharge.   - couple years ago had chlamydia.   - past 2 years 7 or 8. Females only. Protection for 1 year with gf.       Toe concerns   2-3 weeks ago patient noticed blue discoloration to the tip of his right great toe.  He knows there are spiders in his current house and wanted to be sure he did not have a spider bite.  There was never any pain present, and it is not hurting him now.  Problem, Medication and Allergy Lists were      Patient Active Problem List    Diagnosis Date Noted     Postconcussion syndrome 11/01/2018     Priority: Medium     Mood disorder (H) 08/21/2018     Priority: Medium     Tobacco use disorder 08/21/2018     Priority: Medium     Amphetamine substance use disorder, moderate, in early remission (H) 08/21/2018     Priority: Medium     Recurrent major depressive disorder, remission status  unspecified (H) 03/22/2017     Priority: Medium     Anxiety 03/22/2017     Priority: Medium   ,     Current Outpatient Medications   Medication Sig Dispense Refill     azithromycin (ZITHROMAX) 250 MG tablet Take 4 tablets (1,000 mg) by mouth once for 1 dose 6 tablet 0     buPROPion (WELLBUTRIN XL) 300 MG 24 hr tablet Take 1 tablet (300 mg) by mouth every morning 90 tablet 1     traZODone (DESYREL) 100 MG tablet Take 100 mg by mouth       albuterol (PROAIR HFA/PROVENTIL HFA/VENTOLIN HFA) 108 (90 Base) MCG/ACT inhaler Inhale 2 puffs into the lungs every 4 hours as needed for shortness of breath / dyspnea or wheezing (Patient not taking: Reported on 1/9/2019) 1 Inhaler 1     celecoxib (CELEBREX) 200 MG capsule Take 1 capsule (200 mg) by mouth 2 times daily as needed for moderate pain (Patient not taking: Reported on 1/9/2019) 60 capsule 1     cloNIDine (CATAPRES) 0.1 MG tablet Take 1 tablet (0.1 mg) by mouth 3 times daily 60 tablet      lamoTRIgine (LAMICTAL) 25 MG tablet Take 25 mg by mouth       QUEtiapine (SEROQUEL) 100 MG tablet Takes 100 mg Q AM, and Noon.  Takes 400 mg Q HS - Total of 600 mg/day     ,     Allergies   Allergen Reactions     Chicken-Derived Products (Egg) GI Disturbance and Other (See Comments)     Does not tolerate them  Does not tolerate them  Does not tolerate them     Depakote [Valproic Acid] Other (See Comments)     Behavior - maniac     No Clinical Screening - See Comments      Seasonal     Seasonal Allergies      Aspirin Rash   .    Patient is an established patient of this clinic..         Review of Systems:   Review of Systems   Constitutional: Negative for appetite change, chills, fever and unexpected weight change.   Respiratory: Negative for cough and shortness of breath.    Cardiovascular: Negative for chest pain.   Gastrointestinal: Negative for abdominal distention, abdominal pain, diarrhea and nausea.   Genitourinary: Positive for discharge, dysuria and penile pain. Negative for  decreased urine volume, difficulty urinating, flank pain and frequency.   Musculoskeletal: Negative for arthralgias, back pain, myalgias and neck pain.   Neurological: Negative for headaches.   Psychiatric/Behavioral: Positive for behavioral problems, decreased concentration and sleep disturbance. Negative for self-injury and suicidal ideas. The patient is nervous/anxious.             Physical Exam:     Vitals:    01/09/19 0943   BP: 132/82   Pulse: 80   Temp: 98  F (36.7  C)   SpO2: 100%   Weight: 72.8 kg (160 lb 9.6 oz)     Body mass index is 26.73 kg/m .  Vitals were reviewed and were normal     Physical Exam   Constitutional: He is oriented to person, place, and time. He appears well-developed and well-nourished. No distress.   HENT:   Head: Normocephalic and atraumatic.   Eyes: Conjunctivae are normal. Right eye exhibits no discharge. Left eye exhibits no discharge.   Pulmonary/Chest: Effort normal. No respiratory distress.   Musculoskeletal: He exhibits no edema or deformity.   Right great toe with no signs of erythema, skin breakage, nail bed is healthy and intact, no pain with passive motion of the joint and strength and sensation intact   Neurological: He is alert and oriented to person, place, and time. Coordination normal.   Skin: No rash noted. No erythema. No pallor.   Psychiatric: He has a normal mood and affect.     Alertness:  alert   Appearance:  adequately groomed  Behavior/Demeanor:  cooperative but occasionaly gaurded , with good  eye contact.  Speech:  increased rate  Psychomotor:  normal or unremarkable    Mood:  okay  Affect:  blunted and was congruent to speech content.  Thought Process/Associations: disorganized   Thought Content: devoid of  suicidal ideation, violent ideation, delusions, obsessions  and paranoid ideation.   Perception: significant for none  Insight:  adequate.  Judgment: fair.  Attention/Concentration:  Fair  Language:  Intact  Fund of Knowledge:  Average.    Memory:  Did  not fully assess but was able to verify dates of when going to the ER and a general idea of when certain medicines ran out in the chart. .      These cognitive functions grossly appear as described, but were not formally tested.    Results:      Results from this visit  Results for orders placed or performed in visit on 01/09/19   Urinalysis, Micro If (UA) ([facility])   Result Value Ref Range    Specific Gravity Urine 1.020 1.005 - 1.030    pH Urine 6.5 4.5 - 8.0    Leukocyte Esterase UR Negative NEGATIVE    Nitrite Urine Negative NEGATIVE    Protein UR Negative NEGATIVE    Glucose Urine Negative NEGATIVE    Ketones Urine Negative NEGATIVE    Urobilinogen mg/dL 0.2 E.U./dL 0.2 E.U./dL    Bilirubin UR Negative NEGATIVE    Blood UR Negative NEGATIVE   Rapid Urine Drug Screen ([facility])   Result Value Ref Range    Phencyclidine NEGATIVE NEGATIVE    Propoxyphene NEGATIVE NEGATIVE    Tricyclic Antidepressants NEGATIVE NEGATIVE    Amphetamines Qual NEGATIVE NEGATIVE    Barbiturates Qual Urine NEGATIVE NEGATIVE    Buprenorphine Qual Urine NEGATIVE NEGATIVE    Benzodiazepine Qual Urine NEGATIVE NEGATIVE    Cocaine Qual Urine POSITIVE (A) NEGATIVE    Cannabinoids Qual Urine NEGATIVE NEGATIVE    Methamphetamine Qual NEGATIVE NEGATIVE    Methadone Qual NEGATIVE NEGATIVE    Morphine Qual NEGATIVE NEGATIVE    Oxycodone Qual NEGATIVE NEGATIVE    Temperature of Urine was Between  Degrees F YES YES       Assessment and Plan        Patient Instructions   Here is the plan from today's visit    1. Dysuria/ Concern for STD - 1 week of burning even when not urinating, 1 day of faint white discharge.   Today we are going to check syphillis, HIV, gonorrhea, chlamydia. I will call you and Chet Jones   with results.   - Azithromycin 1g please take this prior to leaving [facility] this will treat chlamydia.   -Patient instructed verbally to abstain from intercourse until we call him with these results.  If he tests  positive would recommend discussion with his girlfriend so she has tested positive and treated before they have intercourse again    - Urinalysis, Micro If (UA) ([facility])  - RPR, GC/Chlam by urine, HIV    2. Amphetamine substance use disorder, moderate, in early remission (H)  Keep up the good work with NA.   Make sure Dr. Arguelles is aware of your relapse    3. Mood disorder (H) -social work team aided in the assistance of this patient, contacted Dr. Myrick office who stated they did not want patient to have a bridge of Seroquel and clonidine prior to seeing Dr. Myrick on the 14th  Oswald Arguelles appointment 1/14.   Continue wellbutrin and traozodone.   - we will not prescribe the seroquel and clonidine. Please see Dr. Arguelles to discuss restarting these medicines.     4. Toe  Concerns  There are no signs or symptoms of infection noted at this time.  Unable to determine what event happened 3 weeks prior with discoloration the patient saw given that today's exam is benign.  Instructed patient to clean his feet daily and check for any new lesions.  Can call to discuss if he notices any new findings    Please call or return to clinic if your symptoms don't go away.    Follow up plan  Follow-up with your primary care doctor in 2 weeks to discuss psychiatry follow-up     Thank you for coming to [facility] today.  Lab Testing:  **If you had lab testing today and your results are reassuring or normal they will be mailed to you or sent through [employer] within 7 days.   **If the lab tests need quick action we will call you with the results.  The phone number we will call with results is # [phone] (home) . If this is not the best number please call our clinic and change the number.  Medication Refills:  If you need any refills please call your pharmacy and they will contact us.   If you need to  your refill at a new pharmacy, please contact the new pharmacy directly. The new pharmacy will help you get your  medications transferred faster.   Scheduling:  If you have any concerns about today's visit or wish to schedule another appointment please call our office during normal business hours [phone] (8-5:00 M-F)  If a referral was made to a [facility] and you don't get a call from central scheduling please call [phone].  If a Mammogram was ordered for you at [facility] call [phone] to schedule or change your appointment.  If you had an XRay/CT/Ultrasound/MRI ordered the number is [phone] to schedule or change your radiology appointment.   Medical Concerns:  If you have urgent medical concerns please call [phone] at any time of the day.    Gregg Martinez MD     There are no discontinued medications.    Options for treatment and follow-up care were reviewed with the patient. Paul Godfrey  engaged in the decision making process and verbalized understanding of the options discussed and agreed with the final plan.    Gregg Martinez MD

## 2019-01-09 NOTE — PROGRESS NOTES
Preceptor Attestation:   Patient seen, evaluated and discussed with the resident. I have verified the content of the note, which accurately reflects my assessment of the patient and the plan of care.   Supervising Physician:  Rajesh Fulton MD

## 2019-01-09 NOTE — PATIENT INSTRUCTIONS
Here is the plan from today's visit    1. Dysuria/ Concern for STD   Today we are going to check syphillis, HIV, gonorrhea, chlamydia. I will call you and Chet Jones   with results.   - Azithromycin 1g please take this prior to leaving [facility]     - Urinalysis, Micro If (UA) ([facility])  - RPR, GC/Chlam by urine, HIV    2. Amphetamine substance use disorder, moderate, in early remission (H)  Keep up the good work with NA.     3. Mood disorder (H)  Keep [facility] appointment 1/14.   Continue wellbutrin and traozodone.   - we will not prescribe the seroquel and clonidine. Please see Dr. Arguelles to discuss restarting these medicines.     Please call or return to clinic if your symptoms don't go away.    Follow up plan  If your toe is giving you issues please call and schedule an appt.     Thank you for coming to [facility] today.  Lab Testing:  **If you had lab testing today and your results are reassuring or normal they will be mailed to you or sent through [employer] within 7 days.   **If the lab tests need quick action we will call you with the results.  The phone number we will call with results is # [phone] (home) . If this is not the best number please call our clinic and change the number.  Medication Refills:  If you need any refills please call your pharmacy and they will contact us.   If you need to  your refill at a new pharmacy, please contact the new pharmacy directly. The new pharmacy will help you get your medications transferred faster.   Scheduling:  If you have any concerns about today's visit or wish to schedule another appointment please call our office during normal business hours [phone] (8-5:00 M-F)  If a referral was made to a [facility] and you don't get a call from central scheduling please call [phone].  If a Mammogram was ordered for you at [facility] call [phone] to schedule or change your appointment.  If you  had an XRay/CT/Ultrasound/MRI ordered the number is [phone] to schedule or change your radiology appointment.   Medical Concerns:  If you have urgent medical concerns please call [phone] at any time of the day.    Gregg Martinez MD

## 2019-01-10 LAB
C TRACH DNA SPEC QL NAA+PROBE: NEGATIVE
HIV 1+2 AB+HIV1 P24 AG SERPL QL IA: NONREACTIVE
N GONORRHOEA DNA SPEC QL NAA+PROBE: NEGATIVE
SPECIMEN SOURCE: NORMAL
SPECIMEN SOURCE: NORMAL
T PALLIDUM AB SER QL: NONREACTIVE

## 2019-02-21 ENCOUNTER — OFFICE VISIT (OUTPATIENT)
Dept: FAMILY MEDICINE | Facility: CLINIC | Age: 44
End: 2019-02-21
Payer: COMMERCIAL

## 2019-02-21 VITALS
TEMPERATURE: 97.8 F | BODY MASS INDEX: 25.63 KG/M2 | SYSTOLIC BLOOD PRESSURE: 149 MMHG | WEIGHT: 154 LBS | DIASTOLIC BLOOD PRESSURE: 94 MMHG | OXYGEN SATURATION: 98 % | HEART RATE: 102 BPM

## 2019-02-21 DIAGNOSIS — R03.0 ELEVATED BLOOD PRESSURE READING WITHOUT DIAGNOSIS OF HYPERTENSION: ICD-10-CM

## 2019-02-21 DIAGNOSIS — B07.0 PLANTAR WARTS: Primary | ICD-10-CM

## 2019-02-21 RX ORDER — HYDROXYZINE HYDROCHLORIDE 50 MG/1
50 TABLET, FILM COATED ORAL 3 TIMES DAILY PRN
COMMUNITY
End: 2019-04-18

## 2019-02-21 NOTE — PATIENT INSTRUCTIONS
Use the salicylic acid strips every day. 48 hours with duct tape.    Look out for site reaction.    Come back in 4 weeks if it comes back or gets worse. Do not use the salicylic acid for more than 12 weeks without seeing us first.

## 2019-02-21 NOTE — PROGRESS NOTES
"       HPI       Paul Godfrey is a 43 year old  who presents for   Chief Complaint   Patient presents with     Derm Problem     wart on right foot in the arch       Rash/Lesion  Onset: \"for a while\" - believes he waited too long to have it treated but was hoping it would go away on its own.    Description:   Location: foot  Color: flesh colored -> hyperpigmented  Character: round, painful  Itching (Pruritis): no  Pain?:Yes Details:     Progression of Symptoms:  worsening    Accompanying Signs & Symptoms:  Fever: no  Body aches or joint pain:  no  Sore throat symptoms:no  Recent cold symptoms: no    History:   Previous similar rash: Yes Details: on his hands. Has had both paring down and cryotherapy. This has had to be done multiple times    Precipitating factors:   Exposure to similar rash: no  New exposures: Yes Details: Started working one week ago - utilizing lots of socks and feet are often sweaty  Recent travel: no  New Medication: no    What makes it better?:  nothing  Therapies Tried and outcome:  Nothing        Problem, Medication and Allergy Lists were reviewed and updated if needed..    Patient is an established patient of this clinic..         Review of Systems:   Review of Systems   See HPI         Physical Exam:     Vitals:    02/21/19 1134   BP: (!) 149/94   Pulse: 102   Temp: 97.8  F (36.6  C)   TempSrc: Oral   SpO2: 98%   Weight: 69.9 kg (154 lb)     Body mass index is 25.63 kg/m .  Vital signs normal except BP and HR.     Physical Exam   Constitutional: He appears well-developed and well-nourished.   HENT:   Head: Normocephalic and atraumatic.   Eyes: Pupils are equal, round, and reactive to light.   Pulmonary/Chest: Effort normal.   Musculoskeletal: Normal range of motion. He exhibits no edema or deformity.   Skin: Lesion noted.   ~1.5 cm diameter hyperpigmented skin surrounding 3mm hyperkeratotic lesion in instep of R foot.    Hyperkeratosis noted in junction of 1st and 2nd digits of R hand on " palmar side.   Nursing note and vitals reviewed.    Plantar Wart Removal Note    Paul is a 43 year old patient who presents for treatment of a lesion on R foot.  Pre-procedure diagnosis:Plantar Wart  Post-procedure diagnosis: Unchanged  Consent: Verbal, explained risks (scarring, recurrence, pain) and benefits of treatment (resolution of lesion)  and non treatment with patient and he expressed understanding and a desire to continue treatment.    15 blade used to pare down skin until pain. Unable to see capillaries/blood.    Patient tolerated procedure well.        Results:   No testing ordered today    Assessment and Plan        Paul was seen today for derm problem.    Diagnoses and all orders for this visit:    Plantar warts  -     Salicylic Acid 40 % STRP; Externally apply 1 strip topically daily  -     Paring/Shaving of a Single Lesion [13554]    Elevated blood pressure reading without diagnosis of hypertension  Comments:  Review of previous vitals shows mostly normotensive. Advise continued monitoring. may be secondary to anxiety or taken too soon after walking    Advised patient to return in 4 weeks for recheck/retreatment if needed.    Patient Instructions   Use the salicylic acid strips every day. 48 hours with duct tape.    Look out for site reaction.    Come back in 4 weeks if it comes back or gets worse. Do not use the salicylic acid for more than 12 weeks without seeing us first.         Medications Discontinued During This Encounter   Medication Reason     celecoxib (CELEBREX) 200 MG capsule        Options for treatment and follow-up care were reviewed with the patient. Paul Godfrey  engaged in the decision making process and verbalized understanding of the options discussed and agreed with the final plan.    Barron Zacarias MD

## 2019-04-03 ENCOUNTER — OFFICE VISIT (OUTPATIENT)
Dept: FAMILY MEDICINE | Facility: CLINIC | Age: 44
End: 2019-04-03
Payer: COMMERCIAL

## 2019-04-03 VITALS
SYSTOLIC BLOOD PRESSURE: 110 MMHG | WEIGHT: 160 LBS | OXYGEN SATURATION: 95 % | TEMPERATURE: 97.9 F | HEART RATE: 65 BPM | DIASTOLIC BLOOD PRESSURE: 75 MMHG | BODY MASS INDEX: 26.63 KG/M2

## 2019-04-03 DIAGNOSIS — F07.81 POSTCONCUSSION SYNDROME: Primary | ICD-10-CM

## 2019-04-03 DIAGNOSIS — Z00.00 HEALTHCARE MAINTENANCE: ICD-10-CM

## 2019-04-03 DIAGNOSIS — Z11.3 SCREEN FOR STD (SEXUALLY TRANSMITTED DISEASE): ICD-10-CM

## 2019-04-03 RX ORDER — BUSPIRONE HYDROCHLORIDE 10 MG/1
10 TABLET ORAL
COMMUNITY
Start: 2016-04-18 | End: 2019-04-18

## 2019-04-03 RX ORDER — PROPRANOLOL HYDROCHLORIDE 10 MG/1
TABLET ORAL
Refills: 0 | COMMUNITY
Start: 2019-04-02 | End: 2019-04-18

## 2019-04-03 RX ORDER — MIRTAZAPINE 15 MG/1
TABLET, FILM COATED ORAL
Refills: 0 | COMMUNITY
Start: 2019-04-02

## 2019-04-03 RX ORDER — VENLAFAXINE HYDROCHLORIDE 75 MG/1
75 CAPSULE, EXTENDED RELEASE ORAL
COMMUNITY
Start: 2016-04-18

## 2019-04-03 NOTE — PROGRESS NOTES
Preceptor Attestation:   Patient seen, evaluated and discussed with the resident. I have verified the content of the note, which accurately reflects my assessment of the patient and the plan of care.   Supervising Physician:  Ana Rodriguez MD
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
## 2019-04-03 NOTE — PATIENT INSTRUCTIONS
Here is the plan from today's visit    1. Postconcussion syndrome  - CONCUSSION  REFERRAL    2. STD screening  - Neisseria gonorrhoeae PCR  - Chlamydia trachomatis PCR  - Treponema Abs w Reflex to RPR and Titer  - HIV Antigen Antibody Combo      Please call or return to clinic if your symptoms don't go away.    Follow up plan  As needed.    Thank you for coming to [facility] today.  Lab Testing:  **If you had lab testing today and your results are reassuring or normal they will be mailed to you or sent through [email or web address] within 7 days.   **If the lab tests need quick action we will call you with the results.  The phone number we will call with results is # [phone] (home) . If this is not the best number please call our clinic and change the number.  Medication Refills:  If you need any refills please call your pharmacy and they will contact us.   If you need to  your refill at a new pharmacy, please contact the new pharmacy directly. The new pharmacy will help you get your medications transferred faster.   Scheduling:  If you have any concerns about today's visit or wish to schedule another appointment please call our office during normal business hours [phone] (8-5:00 M-F)  If a referral was made to a [facility] and you don't get a call from central scheduling please call [phone].  If a Mammogram was ordered for you at [facility] call [phone] to schedule or change your appointment.  If you had an XRay/CT/Ultrasound/MRI ordered the number is [phone] to schedule or change your radiology appointment.   Medical Concerns:  If you have urgent medical concerns please call [phone] at any time of the day.    Karen Lopez MD

## 2019-04-03 NOTE — LETTER
April 5, 2019      Paul Godfrey  [address]  [place] [address]        Dear Paul,    Thank you for getting your care at [facility]. Your STI screening was negative. Please see below for your test results.    Resulted Orders   Treponema Abs w Reflex to RPR and Titer   Result Value Ref Range    Treponema Antibodies Nonreactive NR^Nonreactive   HIV Antigen Antibody Combo   Result Value Ref Range    HIV Antigen Antibody Combo Nonreactive NR^Nonreactive          Comment:      HIV-1 p24 Ag & HIV-1/HIV-2 Ab Not Detected       If you have any questions, please call the clinic to make an appointment with me for a clinic visit.    Sincerely,    Karen Lopez MD

## 2019-04-04 LAB
HIV 1+2 AB+HIV1 P24 AG SERPL QL IA: NONREACTIVE
T PALLIDUM AB SER QL: NONREACTIVE

## 2019-04-18 ENCOUNTER — OFFICE VISIT (OUTPATIENT)
Dept: PHARMACY | Facility: CLINIC | Age: 44
End: 2019-04-18
Payer: COMMERCIAL

## 2019-04-18 ENCOUNTER — OFFICE VISIT (OUTPATIENT)
Dept: FAMILY MEDICINE | Facility: CLINIC | Age: 44
End: 2019-04-18
Payer: COMMERCIAL

## 2019-04-18 VITALS
SYSTOLIC BLOOD PRESSURE: 122 MMHG | HEIGHT: 68 IN | TEMPERATURE: 98.2 F | BODY MASS INDEX: 24.8 KG/M2 | HEART RATE: 89 BPM | WEIGHT: 163.6 LBS | DIASTOLIC BLOOD PRESSURE: 86 MMHG | OXYGEN SATURATION: 96 %

## 2019-04-18 DIAGNOSIS — F33.9 RECURRENT MAJOR DEPRESSIVE DISORDER, REMISSION STATUS UNSPECIFIED (H): Primary | ICD-10-CM

## 2019-04-18 DIAGNOSIS — J45.20 INTERMITTENT ASTHMA, UNCOMPLICATED: ICD-10-CM

## 2019-04-18 DIAGNOSIS — R35.89 POLYURIA: ICD-10-CM

## 2019-04-18 DIAGNOSIS — E55.9 VITAMIN D DEFICIENCY: ICD-10-CM

## 2019-04-18 DIAGNOSIS — F15.21 AMPHETAMINE SUBSTANCE USE DISORDER, MODERATE, IN EARLY REMISSION (H): ICD-10-CM

## 2019-04-18 DIAGNOSIS — F41.9 ANXIETY: ICD-10-CM

## 2019-04-18 DIAGNOSIS — J30.2 SEASONAL ALLERGIC RHINITIS, UNSPECIFIED TRIGGER: ICD-10-CM

## 2019-04-18 DIAGNOSIS — R53.83 OTHER FATIGUE: ICD-10-CM

## 2019-04-18 LAB
ALBUMIN SERPL-MCNC: 4.6 MG/DL (ref 3.8–5)
ALP SERPL-CCNC: 83.7 U/L (ref 31.7–110.7)
ALT SERPL-CCNC: 20.5 U/L (ref 0–45)
AMPHETAMINES QUAL: NEGATIVE
AST SERPL-CCNC: 17 U/L (ref 0–55)
BARBITURATES QUAL URINE: NEGATIVE
BENZODIAZEPINE QUAL URINE: NEGATIVE
BILIRUB SERPL-MCNC: <0.4 MG/DL (ref 0.2–1.3)
BUN SERPL-MCNC: 9.1 MG/DL (ref 7–21)
BUPRENORPHINE QUAL URINE: NEGATIVE
CALCIUM SERPL-MCNC: 9.4 MG/DL (ref 8.5–10.1)
CANNABINOIDS UR QL SCN: NEGATIVE
CHLORIDE SERPLBLD-SCNC: 105.4 MMOL/L (ref 98–110)
CO2 SERPL-SCNC: 24.5 MMOL/L (ref 20–32)
COCAINE QUAL URINE: POSITIVE
CREAT SERPL-MCNC: 1 MG/DL (ref 0.7–1.3)
GFR SERPL CREATININE-BSD FRML MDRD: 85.9 ML/MIN/1.7 M2
GLUCOSE SERPL-MCNC: 114.1 MG'DL (ref 70–99)
HBA1C MFR BLD: 5.7 % (ref 4.1–5.7)
METHAMPHETAMINE: NEGATIVE
METHODONE QUAL: NEGATIVE
MORPHINE QUAL: NEGATIVE
OXYCODONE QUAL: NEGATIVE
PHENCYCLIDINE: NEGATIVE
POTASSIUM SERPL-SCNC: 4.2 MMOL/DL (ref 3.3–4.5)
PROPOXYPHENE: NEGATIVE
PROT SERPL-MCNC: 7.5 G/DL (ref 6.8–8.8)
SODIUM SERPL-SCNC: 137.3 MMOL/L (ref 132.6–141.4)
TEMPERATURE OF URINE WAS BETWEEN 90-100 DEGREES F: YES
TRICYCLIC ANTIDEPRESSANTS: POSITIVE
TSH SERPL DL<=0.005 MIU/L-ACNC: 2.38 MU/L (ref 0.4–4)

## 2019-04-18 RX ORDER — BUSPIRONE HYDROCHLORIDE 10 MG/1
10 TABLET ORAL 3 TIMES DAILY
Qty: 90 TABLET | Refills: 1 | COMMUNITY
Start: 2019-04-18

## 2019-04-18 RX ORDER — ALBUTEROL SULFATE 90 UG/1
2 AEROSOL, METERED RESPIRATORY (INHALATION) EVERY 4 HOURS PRN
Qty: 18 G | Refills: 1 | Status: SHIPPED | OUTPATIENT
Start: 2019-04-18

## 2019-04-18 RX ORDER — PROPRANOLOL HYDROCHLORIDE 10 MG/1
10 TABLET ORAL 3 TIMES DAILY
Qty: 90 TABLET | Refills: 0 | COMMUNITY
Start: 2019-04-18

## 2019-04-18 RX ORDER — MULTIVITAMIN
1 TABLET ORAL DAILY
Qty: 180 TABLET | Refills: 1 | Status: SHIPPED | OUTPATIENT
Start: 2019-04-18 | End: 2019-04-18

## 2019-04-18 RX ORDER — MULTIVITAMIN
1 TABLET ORAL DAILY
Qty: 180 TABLET | Refills: 1 | Status: SHIPPED | OUTPATIENT
Start: 2019-04-18

## 2019-04-18 ASSESSMENT — MIFFLIN-ST. JEOR: SCORE: 1601.58

## 2019-04-18 ASSESSMENT — PATIENT HEALTH QUESTIONNAIRE - PHQ9: SUM OF ALL RESPONSES TO PHQ QUESTIONS 1-9: 19
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
## 2019-04-18 NOTE — PROGRESS NOTES
Preceptor Attestation:   Patient seen and discussed with the resident. Assessment and plan reviewed with resident and agreed upon.   Supervising Physician:  Larry Watkins MD  [facility]

## 2019-04-18 NOTE — PATIENT INSTRUCTIONS
Here is the plan from today's visit    Call [phone] to schedule again with concussion clinic    1. Recurrent major depressive disorder, remission status unspecified (H)  - Multiple Vitamin (MULTI VITAMIN DAILY) TABS; Take 1 tablet by mouth daily  Dispense: 180 tablet; Refill: 1    2. Anxiety  - propranolol (INDERAL) 10 MG tablet; Take 1 tablet (10 mg) by mouth 3 times daily  Dispense: 90 tablet; Refill: 0  - busPIRone (BUSPAR) 10 MG tablet; Take 1 tablet (10 mg) by mouth 3 times daily  Dispense: 90 tablet; Refill: 1    3. Amphetamine substance use disorder, moderate, in early remission (H)  - Comprehensive Metabolic Panel ([facility])  - Rapid Urine Drug Screen ([facility])  - Multiple Vitamin (MULTI VITAMIN DAILY) TABS; Take 1 tablet by mouth daily  Dispense: 180 tablet; Refill: 1    4. Seasonal allergic rhinitis, unspecified trigger  5. Intermittent asthma, uncomplicated  - albuterol (PROAIR HFA/PROVENTIL HFA/VENTOLIN HFA) 108 (90 Base) MCG/ACT inhaler; Inhale 2 puffs into the lungs every 4 hours as needed for shortness of breath / dyspnea or wheezing  Dispense: 18 g; Refill: 1    6. Polyuria  - Hemoglobin A1c ([facility])  - TSH with free T4 reflex    7. Other fatigue  - Vitamin D Level    Please call or return to clinic if your symptoms don't go away.    Follow up plan  Please make a clinic appointment for follow up with me (ARLET LEON) in 2 weeks.    Thank you for coming to [facility] today.  Lab Testing:  **If you had lab testing today and your results are reassuring or normal they will be mailed to you or sent through [employer] within 7 days.   **If the lab tests need quick action we will call you with the results.  The phone number we will call with results is # [phone] (home) . If this is not the best number please call our clinic and change the number.  Medication Refills:  If you need any refills please call your pharmacy and they will contact us.   If you need to  your refill at a  new pharmacy, please contact the new pharmacy directly. The new pharmacy will help you get your medications transferred faster.   Scheduling:  If you have any concerns about today's visit or wish to schedule another appointment please call our office during normal business hours [phone] (8-5:00 M-F)  If a referral was made to a [facility] and you don't get a call from central scheduling please call [phone].  If a Mammogram was ordered for you at [facility] call [phone] to schedule or change your appointment.  If you had an XRay/CT/Ultrasound/MRI ordered the number is [phone] to schedule or change your radiology appointment.   Medical Concerns:  If you have urgent medical concerns please call [phone] at any time of the day.    Amador Mena MD

## 2019-04-19 LAB — DEPRECATED CALCIDIOL+CALCIFEROL SERPL-MC: 15 UG/L (ref 20–75)

## 2019-04-22 ENCOUNTER — TELEPHONE (OUTPATIENT)
Dept: FAMILY MEDICINE | Facility: CLINIC | Age: 44
End: 2019-04-22

## 2019-04-22 NOTE — TELEPHONE ENCOUNTER
"PCP requests, \"Please call with results. Please let patient know that his vitamin D level was low, I have prescribe a supplement he should take daily, we can recheck the level in 3 months.   Otherwise, he did have positive testing for cocaine on urine drug screen.  We did discuss his recent relapse during visit but I am concerned that he may have used in the last 1-2 days as it does not stay in the urine long.  If he has used more recently I recommend that he come back this week to discuss if further treatment available.       Amador Mena MD \"    RN attempted to reach patient, but was unable to reach. Left voicemail with name and callback number.  Analisa Barth RN    "

## 2019-04-24 ASSESSMENT — ENCOUNTER SYMPTOMS
DIARRHEA: 0
COUGH: 0
FATIGUE: 1
DYSPHORIC MOOD: 1
SLEEP DISTURBANCE: 1
VOMITING: 0
SHORTNESS OF BREATH: 0
HALLUCINATIONS: 0
WEAKNESS: 0
EYE PAIN: 0
LIGHT-HEADEDNESS: 0
BACK PAIN: 1
SORE THROAT: 0
UNEXPECTED WEIGHT CHANGE: 0
HEADACHES: 1
DECREASED CONCENTRATION: 1
ABDOMINAL PAIN: 0
CONFUSION: 1
CHILLS: 0
DYSURIA: 0
JOINT SWELLING: 0
NAUSEA: 0
SEIZURES: 0
ARTHRALGIAS: 1
FEVER: 0
DIZZINESS: 1
PHOTOPHOBIA: 0

## 2019-04-25 NOTE — TELEPHONE ENCOUNTER
RN left [facility] with name and callback number on 2 numbers. Will send mychart    Shannon Escalante RN

## 2019-06-25 ENCOUNTER — TELEPHONE (OUTPATIENT)
Dept: FAMILY MEDICINE | Facility: CLINIC | Age: 44
End: 2019-06-25

## 2019-06-25 NOTE — TELEPHONE ENCOUNTER
RN attempted reaching patient and he was not available, left message for him to call back. Please transfer him to RN when he calls back.  Analisa Barth RN
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
## 2019-06-25 NOTE — TELEPHONE ENCOUNTER
[facility] phone call message-patient reporting a symptom:     Symptom:  Patient has been off his medications and is feeling depressed, crying. Need to talk to someone ASAP    When did symptoms begin after going off medications 3 weeks ago.     Characteristics: (location on body, intensity, what makes it better or worse, associated symptoms )      Additional Details       Same Day Visit Offered: No    Additional comments:     OK to leave message on voice mail? Yes    Advised patient that RN would call back within 3 hours, unless emergent   Primary language: English      needed? No    Call taken on June 25, 2019 at 11:14 AM by Lenora Garcia

## 2019-08-05 ENCOUNTER — HOSPITAL ENCOUNTER (EMERGENCY)
Facility: CLINIC | Age: 44
Discharge: HOME | End: 2019-08-05
Attending: EMERGENCY MEDICINE | Admitting: EMERGENCY MEDICINE
Payer: MEDICAID

## 2019-08-05 VITALS
WEIGHT: 145 LBS | TEMPERATURE: 97.8 F | HEIGHT: 65 IN | RESPIRATION RATE: 20 BRPM | BODY MASS INDEX: 24.16 KG/M2 | SYSTOLIC BLOOD PRESSURE: 111 MMHG | DIASTOLIC BLOOD PRESSURE: 93 MMHG | OXYGEN SATURATION: 100 %

## 2019-08-05 DIAGNOSIS — F32.A DEPRESSION, UNSPECIFIED DEPRESSION TYPE: ICD-10-CM

## 2019-08-05 DIAGNOSIS — R45.86 EMOTIONAL LABILITY: ICD-10-CM

## 2019-08-05 PROCEDURE — 90791 PSYCH DIAGNOSTIC EVALUATION: CPT

## 2019-08-05 PROCEDURE — 99285 EMERGENCY DEPT VISIT HI MDM: CPT | Mod: 25

## 2019-08-05 ASSESSMENT — ENCOUNTER SYMPTOMS
SHORTNESS OF BREATH: 0
FEVER: 0

## 2019-08-05 ASSESSMENT — MIFFLIN-ST. JEOR: SCORE: 1464.6

## 2019-08-05 NOTE — ED PROVIDER NOTES
"  History     Chief Complaint:  Depression    HPI   Paul Godfrey is a 44 year old male, with a history of anxiety and depression, who presents with concerns of depression in the setting of recent discharge of inpatient psychiatric care. The patient states that he was recently discharged from [facility] for psychiatric care and is currently residing at [facility]. He had called his family who he states \"doesn't understand mental health\" and he felt overwhelmed. He had left the house approximately 24 hours ago walking around [facility] and surrounding areas. Tonight, the patient had called 911 and they brought him to the ED. Here, the patient states that he does not have a sense of \"hopelessness\" and would like to return to [facility] as he felt like he didn't handle the calls with his family as well as he had hoped. He denies any fever, chest pain, shortness of breath, suicidal ideation, homicidal ideation, visual or auditory hallucinations. He did smoke marijuana recently, but no other drugs or alcohol use. The patient states that he has been taking his medications as prescribed.     Allergies:  Chicken-Derived Products (Egg)  Depakote [Valproic Acid]  Seasonal Allergies  Sulfamethoxazole W/Trimethoprim  Aspirin      Medications:    Albuterol  Wellbutrin  Buspar  Catapres  Remeron  Inderal  Seroquel  Trazodone  Effexor    Past Medical History:    Anxiety  Asthma  Depression  Hearing loss  Polysubstance abuse    Past Surgical History:    Right shoulder surgery  Lumbar puncture     Family History:    Heart disease  Kidney disease    Social History:  Current Every Day Smoker   Negative for alcohol use.  Marital Status:   [4]     Review of Systems   Constitutional: Negative for fever.   Respiratory: Negative for shortness of breath.    Cardiovascular: Negative for chest pain.   Psychiatric/Behavioral: Negative for suicidal ideas.   All other systems reviewed and are negative.      Physical Exam " "  First Vitals:  BP: (!) 111/93  Heart Rate: 95  Temp: 97.8  F (36.6  C)  Resp: 20  Height: 165.1 cm (5' 5\")  Weight: 65.8 kg (145 lb)  SpO2: 100 %    Physical Exam  General: Alert, appears well-developed and well-nourished. Cooperative.     In no distress  HEENT:  Head:  Atraumatic  Ears:  External ears are normal  Mouth/Throat:  Oropharynx is without erythema or exudate and mucous membranes are moist.   Eyes:   Conjunctivae normal and EOM are normal. No scleral icterus.  CV:  Normal rate, regular rhythm, normal heart sounds and radial pulses are 2+ and symmetric.  No murmur.  Resp:  Breath sounds are clear bilaterally    Non-labored, no retractions or accessory muscle use  GI:  Abdomen is soft, no distension, no tenderness. No rebound or guarding.  No CVA tenderness bilaterally  MS:  Normal range of motion. No edema.    Normal strength in all 4 extremities.     Back atraumatic.    No midline cervical, thoracic, or lumbar tenderness  Skin:  Warm and dry.  No rash or lesions noted.  Neuro: Alert. Normal strength.  GCS: 15  Psych:  Depressed mood and affect.  Denies SI/HI.  Denies hallucinations.     Emergency Department Course     Emergency Department Course:  Nursing notes and vitals reviewed.     0459  I performed an exam of the patient as documented above. DEC was consulted about the patient.     I rechecked the patient and discussed the results of his workup thus far.     Findings and plan explained to the Patient. Patient discharged home with instructions regarding supportive care, medications, and reasons to return. The importance of close follow-up was reviewed.    I personally reviewed the laboratory results with the Patient and answered all related questions prior to discharge.     Impression & Plan      Medical Decision Making:  Patient is a 44-year-old male with a history of depression and substance abuse who presents after feeling increasingly lonely and emotional after having a discussion with his " mother.  Patient was recently discharged from inpatient hospitalization due to psychiatric illness and was discharged to intensive outpatient treatment program.  He left this program in the last 24 hours and has been listed as a missing person.  He notes that he was [place] in the last 24 hours.  He denies suicidal or homicidal ideation.  He has been taking his medications regularly.  He is interested in returning back to his treatment program.  Our mental health team assessed the patient and felt he would benefit from return back to his intensive outpatient treatment program.  Patient discharged back to treatment program.  No indication for hospitalization at this time.  Patient comfortable with plan.  Discharged home.    Diagnosis:    ICD-10-CM    1. Emotional lability R45.86    2. Depression, unspecified depression type F32.9        Disposition:  discharged to home    Discharge Medications:     Medication List      There are no discharge medications for this visit.       I, Pamela Adames, am serving as a scribe on 8/5/2019 at 4:59 AM to personally document services performed by Isiah Olivas MD based on my observations and the provider's statements to me.      Pamela Adames  8/5/2019    EMERGENCY DEPARTMENT       Isiah Olivas MD  08/05/19 0619

## 2019-08-05 NOTE — ED AVS SNAPSHOT
Emergency Department  [address] [place] [id number]  Phone:  [phone]  Fax:  [phone]                                    Paul Godfrey   MRN: [id number]    Department:   Emergency Department   Date of Visit:  8/5/2019           After Visit Summary Signature Page    I have received my discharge instructions, and my questions have been answered. I have discussed any challenges I see with this plan with the nurse or doctor.    ..........................................................................................................................................  Patient/Patient Representative Signature      ..........................................................................................................................................  Patient Representative Print Name and Relationship to Patient    ..................................................               ................................................  Date                                   Time    ..........................................................................................................................................  Reviewed by Signature/Title    ...................................................              ..............................................  Date                                               Time          22EPIC Rev 08/18

## 2019-08-05 NOTE — ED PROVIDER NOTES
ED course:  Patient received as a handoff from my partner Dr. Olivas.  On face-to-face evaluation patient reports no additional complaints.  Patient was evaluated by DEC and felt appropriate for outpatient follow-up/discharge; I agree with this assessment.  Please see Dr. Olivas's note for additional details    Impression:    ICD-10-CM    1. Emotional lability R45.86    2. Depression, unspecified depression type F32.9        Disposition:  Discharge         Meliton Bynum,   08/05/19 0632

## 2019-08-05 NOTE — ED NOTES
Spoke with Ivy at [facility] ().  Notified of Dec assessment.  Report to Ivy, notified pt will be returning.

## 2019-09-09 ENCOUNTER — TELEPHONE (OUTPATIENT)
Dept: FAMILY MEDICINE | Facility: CLINIC | Age: 44
End: 2019-09-09

## 2019-09-25 ENCOUNTER — TELEPHONE (OUTPATIENT)
Dept: FAMILY MEDICINE | Facility: CLINIC | Age: 44
End: 2019-09-25

## 2019-09-25 NOTE — TELEPHONE ENCOUNTER
"Patient has no showed 2 scheduled appointments. Please use following script to explain no show policy to patient:    \"We see that you have missed some of your recently scheduled appointments. At [facility] we have a new no show policy, where if you miss too many appointments within 1 year, we may not be able to continue to schedule you. If you are unable to make your scheduled appointments, please call the clinic to cancel prior to your appointment time.\"  "

## 2019-09-26 ENCOUNTER — TELEPHONE (OUTPATIENT)
Dept: FAMILY MEDICINE | Facility: CLINIC | Age: 44
End: 2019-09-26
Payer: MEDICAID

## 2019-09-26 NOTE — TELEPHONE ENCOUNTER
"Attempted to reach patient to reschedule hospital f/u per Dr. Lopez - [facility] to call clinic to schedule. See message from Dr. Lopez below.     Bipolar Disorder, [facility], 8/9-8/16, Dr. Hu, Ph:[phone], Fax:[phone]    \"Patient no showed today after recent inpatient psych stay. Would you be able to reach out to patient to make sure everything is going okay and to reschedule them for a visit?     Thank you!   Dalia\"  "

## 2019-10-08 ENCOUNTER — TELEPHONE (OUTPATIENT)
Dept: FAMILY MEDICINE | Facility: CLINIC | Age: 44
End: 2019-10-08

## 2019-10-08 NOTE — TELEPHONE ENCOUNTER
This is just an FYI that the patient has no showed 3 appointments. When patient calls back, they will be scheduled on the virtual schedule to meet with you.

## 2020-03-02 ENCOUNTER — HEALTH MAINTENANCE LETTER (OUTPATIENT)
Age: 45
End: 2020-03-02

## 2020-12-20 ENCOUNTER — HEALTH MAINTENANCE LETTER (OUTPATIENT)
Age: 45
End: 2020-12-20

## 2021-04-18 ENCOUNTER — HEALTH MAINTENANCE LETTER (OUTPATIENT)
Age: 46
End: 2021-04-18

## 2021-10-03 ENCOUNTER — HEALTH MAINTENANCE LETTER (OUTPATIENT)
Age: 46
End: 2021-10-03

## 2022-05-14 ENCOUNTER — HEALTH MAINTENANCE LETTER (OUTPATIENT)
Age: 47
End: 2022-05-14

## 2022-09-04 ENCOUNTER — HEALTH MAINTENANCE LETTER (OUTPATIENT)
Age: 47
End: 2022-09-04

## 2023-06-03 ENCOUNTER — HEALTH MAINTENANCE LETTER (OUTPATIENT)
Age: 48
End: 2023-06-03

## 2024-08-20 NOTE — MR AVS SNAPSHOT
After Visit Summary   9/20/2018    Paul Godfrey    MRN: [id number]           Patient Information     Date Of Birth          1975        Visit Information        Provider Department      9/20/2018 9:00 AM Quinn Bach MD [facility]        Today's Diagnoses     Closed fracture of left zygomatic arch with routine healing, subsequent encounter    -  1    Contusion of right hip, subsequent encounter        Contusion of right knee, subsequent encounter        Acute midline low back pain without sciatica          Care Instructions    Here is the plan from today's visit    1. Closed fracture of left zygomatic arch with routine healing, subsequent encounter  Schedule with ENT at [facility] for evaluation for facial fracture (see referral for details)  - OTOLARYNGOLOGY REFERRAL - INTERNAL  - [facility], PT, HAND AND CHIROPRACTIC REFERRAL - [facility]; Future    2. Contusion of right hip, subsequent encounter  3. Contusion of right knee, subsequent encounter  4. Acute midline low back pain without sciatica  Schedule with Physical Therapy for the following. (see referral for details)  Use the flexeril as needed up to three times a day for pain   Discontinue the Celebrex due to side effects  May continue ibuprofen and Tylenol as needed as currently written  - cyclobenzaprine (FLEXERIL) 10 MG tablet; Take 1 tablet (10 mg) by mouth 3 times daily as needed for muscle spasms  Dispense: 90 tablet; Refill: 0    Thank you for coming to [facility] today.  Lab Testing:  **If you had lab testing today and your results are reassuring or normal they will be mailed to you or sent through [employer] within 7 days.   **If the lab tests need quick action we will call you with the results.  The phone number we will call with results is # [phone] (home) none (work). If this is not the best number please call our clinic and change the number.  Medication Refills:  If you need any refills please call your  Occupational Therapy Visit    Visit Type: Daily Treatment Note  Visit: 6  Referring Provider: Lola Davis MD  Medical Diagnosis (from order): R62.50 - Development delay   SUBJECTIVE                                                                                                             Present and reporting subjective information: mother    Arrived to occupational therapy with mom who remained present for duration of session. Steven greeted therapist nicely and transitioned into and out of session without difficulty.       OBJECTIVE                                                                                                                      Skills/Executive Function:    - Attention: distractability   - Attention impairment: distractibility   - Social skills: decreased eye contact and appropriate joint attention    Behavior  - cooperative, playful, follows 1 step commands and nonverbal              TREATMENT                                                                                                                  Therapeutic Activity:  1. Critter clinic + fruits and vegetables targeting visual motor integration, cooperative play and fine motor development  2. String large beads on shoe string x10 hand-over-hand assistance-max A to thread and pull  3. 3-step obstacle course with puzzle targeting visual motor integration, coordination, and regulation      Skilled input: verbal instruction/cues and tactile instruction/cues        ASSESSMENT                                                                                                               Steven demonstrates good participation and engagement throughout session. He transitioned between activities and followed 1 step instructions. He was regulated throughout session and engaged in cooperative play with therapist for ~8-10 minutes with min-mod prompting. Min A for orientation and placement to complete puzzle. Enjoyed critter clinic and picking door  color for therapist to open.       PLAN                                                                                                                             Suggestions for next session as indicated: Progress per plan of care      GOALS  Long Term Goals: to be met by end of plan of care  1. Patient engages in structured play activities for up to 3 minutes with minimal prompts (for redirection and attention), 3 out of 5 times within a therapy session for developmentally appropriate play particiaption.    Goal Attainment Scale (GAS)    -2: max propmting    -1: mod prompting      0: per goal written above    +1: supervision    +2: independent        GAS Key        -2=Much less than expected outcome (baseline); -1=Less than expected outcome (progressing);          0=Patient achieves expected outcome after intervention (goal, set at evaluation); +1=Better than          expected outcome; +2=Much better than expected outcome    Importance: 2  Difficulty: 3  Weight: 6    Baseline: -2  2. Patient demonstrates developmentally appropriate calming abilities during play or learning activity with set up in 2/4 opportunities to demonstrate improved regulation abilities.    Goal Attainment Scale (GAS)    -2: 0/4 trials    -1: 1/4 trials      0: per goal written above    +1: 3/4 trials    +2: 4/4 trials    Importance: 3  Difficulty: 3  Weight: 9    Baseline: -2  3. Patient participates in cooperative play activity with moderate assistance in 3/5 trials for developmentally appropriate play participation.     Goal Attainment Scale (GAS)    -2: max assistance    -1: moderate assistance      0: per goal written above    +1: verbal prompts only    +2: independent     Importance: 3  Difficulty: 3  Weight: 9    Baseline: -2  GAS T Score Calculations:    Baseline: 22.8      Therapy procedure time and total treatment time can be found documented on the Time Entry flowsheet   pharmacy and they will contact us.   If you need to  your refill at a new pharmacy, please contact the new pharmacy directly. The new pharmacy will help you get your medications transferred faster.   Scheduling:  If you have any concerns about today's visit or wish to schedule another appointment please call our office during normal business hours [phone] (8-5:00 M-F)  If a referral was made to a [facility] and you don't get a call from central scheduling please call [phone].  If a Mammogram was ordered for you at [facility] call [phone] to schedule or change your appointment.  If you had an XRay/CT/Ultrasound/MRI ordered the number is [phone] to schedule or change your radiology appointment.   Medical Concerns:  If you have urgent medical concerns please call [phone] at any time of the day.    Quinn Bach MD            Follow-ups after your visit        Additional Services     [facility], PT, HAND AND CHIROPRACTIC REFERRAL - [facility]       Physical Therapy, Hand Therapy and Chiropractic Care are available through:  *[facility]  *Hand Therapy (Occupational Therapy or Physical Therapy)  *[facility]    Call one number to schedule at any of the above locations: [phone].    Physical therapy, Hand therapy and/or Chiropractic care has been recommended by your physician as an excellent treatment option to reduce pain and help people return to normal activities, including sports.  Therapy and/or chiropractic care services are a great complement or alternative to expensive and invasive surgery, injections, or long-term use of prescription medications. The primary goal is to identify the underlying problem and provide you the tools to manage your condition on your own.     Please be aware that coverage of these services is subject to the terms and limitations of your health insurance plan.  Call member services at your  health plan with any benefit or coverage questions.      Please bring the following to your appointment:  *Your personal calendar for scheduling future appointments  *Comfortable clothing            OTOLARYNGOLOGY REFERRAL - INTERNAL       Your provider has referred you to: [facility]: Adult [facility], [facility]ose [facility] Throat Clinic (Otolaryngology) - [place] [phone]  [email or web address]    Please be aware that coverage of these services is subject to the terms and limitations of your health insurance plan.  Call member services at your health plan with any benefit or coverage questions.      Please bring the following with you to your appointment:    (1) Any X-Rays, CTs or MRIs which have been performed.  Contact the facility where they were done to arrange for  prior to your scheduled appointment.   (2) List of current medications  (3) This referral request   (4) Any documents/labs given to you for this referral                  Your next 10 appointments already scheduled     Sep 26, 2018  9:20 AM CDT   Return Visit with Mario Hawley MD   [facility] ([facility])    [address] [place] [address],  [address]  [place] [address]   [phone]              Future tests that were ordered for you today     Open Future Orders        Priority Expected Expires Ordered    [facility], PT, HAND AND CHIROPRACTIC REFERRAL - [facility] Routine  9/20/2019 9/20/2018            Who to contact     Please call your clinic at [phone] to:    Ask questions about your health    Make or cancel appointments    Discuss your medicines    Learn about your test results    Speak to your doctor            Additional Information About Your Visit        [employer] Information     [employer] gives you secure access to your electronic health record. If you see a primary care provider, you can also send messages to your care team and make appointments. If you have questions, please call  your primary care clinic.  If you do not have a primary care provider, please call [phone] and they will assist you.      [employer] is an electronic gateway that provides easy, online access to your medical records. With [employer], you can request a clinic appointment, read your test results, renew a prescription or communicate with your care team.     To access your existing account, please contact your [facility] or call [phone] for assistance.        Care EveryWhere ID     This is your Care EveryWhere ID. This could be used by other organizations to access your [facility] medical records  [id number]        Your Vitals Were     Pulse Temperature Respirations Pulse Oximetry BMI (Body Mass Index)       85 97.7  F (36.5  C) (Oral) 16 98% 27.49 kg/m2        Blood Pressure from Last 3 Encounters:   09/20/18 126/80   09/15/18 112/85   09/14/18 109/71    Weight from Last 3 Encounters:   09/20/18 165 lb 3.2 oz (74.9 kg)   09/15/18 168 lb (76.2 kg)   09/14/18 160 lb (72.6 kg)              We Performed the Following     OTOLARYNGOLOGY REFERRAL - INTERNAL          Today's Medication Changes          These changes are accurate as of 9/20/18 10:30 AM.  If you have any questions, ask your nurse or doctor.               These medicines have changed or have updated prescriptions.        Dose/Directions    * cyclobenzaprine 10 MG tablet   Commonly known as:  FLEXERIL   This may have changed:  Another medication with the same name was added. Make sure you understand how and when to take each.   Changed by:  Quinn Bach MD        Dose:  10 mg   Take 1 tablet (10 mg) by mouth 3 times daily as needed for muscle spasms   Quantity:  20 tablet   Refills:  0       * cyclobenzaprine 10 MG tablet   Commonly known as:  FLEXERIL   This may have changed:  You were already taking a medication with the same name, and this prescription was added. Make sure you understand how and when to take each.   Used  for:  Acute midline low back pain without sciatica, Contusion of right hip, subsequent encounter   Changed by:  Quinn Bach MD        Dose:  10 mg   Take 1 tablet (10 mg) by mouth 3 times daily as needed for muscle spasms   Quantity:  90 tablet   Refills:  0       * Notice:  This list has 2 medication(s) that are the same as other medications prescribed for you. Read the directions carefully, and ask your doctor or other care provider to review them with you.         Where to get your medicines      These medications were sent to [facility] [employer] - [place], [place] - [address] [employer] [facility] [address]     Phone:  [phone]     cyclobenzaprine 10 MG tablet                Primary Care Provider Office Phone # Fax #    Mario Hawley [phone]       [facility] [address]   [facility] [address]        Equal Access to Services     THOR ÁLVAREZ : Hadii nancy ku hadasho Soomaali, waaxda luqadaha, qaybta kaalmada adeegyada, waxay evanin hayaddien travis ortega . So [facility] [phone].    ATENCIÓN: Si habla español, tiene a christianson disposición servicios gratuitos de asistencia lingüística. [facility] [phone].    We comply with applicable federal civil rights laws and [place] laws. We do not discriminate on the basis of race, color, national origin, age, disability, sex, sexual orientation, or gender identity.            Thank you!     Thank you for choosing [facility]  for your care. Our goal is always to provide you with excellent care. Hearing back from our patients is one way we can continue to improve our services. Please take a few minutes to complete the written survey that you may receive in the mail after your visit with us. Thank you!             Your Updated Medication List - Protect others around you: Learn how to safely use, store and throw away your medicines at www.disposemymeds.org.           This list is accurate as of 9/20/18 10:30 AM.  Always use your most recent med list.                   Brand Name Dispense Instructions for use Diagnosis    albuterol 108 (90 Base) MCG/ACT inhaler    PROAIR HFA/PROVENTIL HFA/VENTOLIN HFA    1 Inhaler    Inhale 2 puffs into the lungs every 4 hours as needed for shortness of breath / dyspnea or wheezing    Intermittent asthma, uncomplicated       buPROPion 300 MG 24 hr tablet    WELLBUTRIN XL     Take 300 mg by mouth        celecoxib 200 MG capsule    celeBREX    60 capsule    Take 1 capsule (200 mg) by mouth 2 times daily as needed for moderate pain    Closed fracture of left zygomatic arch with routine healing, subsequent encounter       cloNIDine 0.1 MG tablet    CATAPRES     Take 0.1 mg by mouth        * cyclobenzaprine 10 MG tablet    FLEXERIL    20 tablet    Take 1 tablet (10 mg) by mouth 3 times daily as needed for muscle spasms        * cyclobenzaprine 10 MG tablet    FLEXERIL    90 tablet    Take 1 tablet (10 mg) by mouth 3 times daily as needed for muscle spasms    Acute midline low back pain without sciatica, Contusion of right hip, subsequent encounter       DULoxetine 20 MG EC capsule    CYMBALTA     Take 20 mg by mouth 2 times daily        HYDROcodone-acetaminophen 5-325 MG per tablet    NORCO    10 tablet    Take 1 tablet by mouth every 6 hours as needed for severe pain    Closed fracture of left zygomatic arch with routine healing, subsequent encounter       lamoTRIgine 25 MG tablet    LaMICtal     Take 25 mg by mouth        * QUEtiapine 400 MG tablet    SEROquel     Take 400 mg by mouth        * QUEtiapine 100 MG tablet    SEROquel     Takes 100 mg Q AM, and Noon.  Takes 400 mg Q HS - Total of 600 mg/day        traZODone 100 MG tablet    DESYREL     Take 100 mg by mouth        * Notice:  This list has 4 medication(s) that are the same as other medications prescribed for you. Read the directions carefully, and ask your doctor or other care provider  to review them with you.

## 2024-10-26 NOTE — PROGRESS NOTES
Male Physical Note          HPI         Concerns today: Patient is currently in residential drug treatment facility and requires physical exam.  Additionally patient says that he gets short of breath with mild exertion.  He denies chest pain, palpitations.  Because of this he wants very badly to quit smoking.  He has cut back from 1.5 packs to 3 cigarettes per day over the last 1 month period.  He has previously tried nicotine gum but it tastes bad.  He would like other options for smoking cessation.          Patient Active Problem List   Diagnosis     Recurrent major depressive disorder, remission status unspecified (H)     Anxiety       History reviewed. No pertinent past medical history.    Previous Medical Care   Hospitalizations at [facility] for mental health reasons.     Family History   Problem Relation Age of Onset     HEART DISEASE Mother      Disease/Disease     KIDNEY DISEASE Father 35      from kidney disease     Diabetes Maternal Grandmother      Diabetes Paternal Grandmother               Review of Systems:     Review of Systems:  CONSTITUTIONAL: NEGATIVE for fever, chills, change in weight  INTEGUMENTARY/SKIN: NEGATIVE for worrisome rashes, moles or lesions  EYES: NEGATIVE for vision changes or irritation  ENT/MOUTH: NEGATIVE for ear, mouth and throat problems  RESP:POSITIVE dyspnea on exertion  BREAST: NEGATIVE for masses, tenderness or discharge  CV: NEGATIVE for chest pain, palpitations or peripheral edema  GI: NEGATIVE for nausea, abdominal pain, heartburn, or change in bowel habits  : NEGATIVE for frequency, dysuria, or hematuria  MUSCULOSKELETAL: NEGATIVE for significant arthralgias or myalgia  NEURO: NEGATIVE for weakness, dizziness or paresthesias  ENDOCRINE: NEGATIVE for temperature intolerance, skin/hair changes  HEME/ALLERGY: NEGATIVE for bleeding problems  PSYCHIATRIC: NEGATIVE for changes in mood or affect  Sleep:   Do you snore most or the night (as reported by a  "family member)? No  Do you feel sleepy or extremely tired during most of the day? No             Social History     Social History     Social History     Marital status:      Spouse name: N/A     Number of children: N/A     Years of education: N/A     Occupational History     Not on file.     Social History Main Topics     Smoking status: Current Every Day Smoker     Packs/day: 0.50     Years: 15.00     Smokeless tobacco: Never Used      Comment: 3 cigarettes/day, not intersted in quitting     Alcohol use No     Drug use: No     Sexual activity: Not on file     Other Topics Concern     Caffeine Concern Yes     SODA / COFFEE >32 OZ DAILY     Parent/Sibling W/ Cabg, Mi Or Angioplasty Before 65f 55m? Yes     mother     Social History Narrative       Marital Status:Single  Who lives in your household? Residential drug treatment    Has anyone hurt you physically, for example by pushing, hitting, slapping or kicking you or forcing you to have sex? Denies  Do you feel threatened or controlled by a partner, ex-partner or anyone in your life? Denies    Sexual Health     Sexual concerns: No   STI History: Last tested 1 month ago, negative      Recommended Screening     Cholesterol Level (>46 yo or at risk):  Recommended and patient accepted testing.               Physical Exam:     Vitals: BP 98/67  Pulse 77  Temp 97.6  F (36.4  C) (Oral)  Ht 5' 6\" (167.6 cm)  Wt 163 lb 9.6 oz (74.2 kg)  SpO2 97%  BMI 26.41 kg/m2  BMI= Body mass index is 26.41 kg/(m^2).  GENERAL: healthy, alert and no distress  EYES: Eyes grossly normal to inspection, extraocular movements - intact, and PERRL  HENT: ear canals- normal; TMs- normal; Nose- normal; Mouth- no ulcers, no lesions.  Missing left lower molar, multiple fillings  NECK: no tenderness, no adenopathy, no asymmetry, no masses, no stiffness; thyroid- normal to palpation  RESP: lungs clear to auscultation - no rales, no rhonchi, no wheezes  BREAST: no masses, no tenderness, no " nipple discharge, no palpable axillary masses or adenopathy  CV: regular rates and rhythm, normal S1 S2, no S3 or S4 and no murmur, no click or rub -  ABDOMEN: soft, no tenderness, no  hepatosplenomegaly, no masses, normal bowel sounds  MS: extremities- no gross deformities noted, no edema  SKIN: no suspicious lesions, no rashes  NEURO: strength and tone- normal, sensory exam- grossly normal, mentation- intact, speech- normal, reflexes- symmetric  BACK: no CVA tenderness, no paralumbar tendernessPSYCH: Alert and oriented times 3; speech- coherent , normal rate and volume; able to articulate logical thoughts, able to abstract reason, no tangential thoughts, no hallucinations or delusions, affect- normal  LYMPHATICS: ant. cervical- normal, post. cervical- normal, axillary- normal, supraclavicular- normal, inguinal- normal    Assessment and Plan      Paul was seen today for physical and forms.    Diagnoses and all orders for this visit:    Mood disorder (H)  -     Comprehensive Metabolic Panel ([facility])    Lipid screening  -     Lipid Dixie ([facility])    Encounter for routine adult medical exam with abnormal findings    Other orders  -     Cancel: CBC with Diff Plt ([facility])    1. Encounter for routine adult medical exam with abnormal findings  Poor dentition, and patient reports dyspnea with exertion without other cardiac symptoms or signs.  Immunizations up to date.  Patient has recent cbc and thyroid studies in care everywhere    2. Mood disorder (H)  On lamotrigine, seroquel, wellbutrin and trazodone per psychiatrist who is managing medications.  Follow up next month with psychiatry  - Comprehensive Metabolic Panel ([facility])    3. Lipid screening  Not only due to age and risk factors, but patient is also on seroquel which predisposes to elevated lipids.  A1c normal 4/18  - Lipid Dixie ([facility])    4. Tobacco use disorder  Discussed options for smoking cessation with patient.  He would like to try  nicotine nasal spray.  Chantix not the best initial option for him because of mood disorder and patient agrees.  He is already on wellbutrin which may have contributed to his ability to cut down so significantly in last 6 weeks.  Will order nicotine nasal spray. Patient will call to find out which pharmacy to send it to.  - nicotine (NICOTROL) 10 MG/ML SOLN inhalation solution; Spray 1 spray in nostril every hour as needed for smoking cessation  Dispense: 3 Bottle; Refill: 3    5. Amphetamine substance use disorder, moderate, in early remission (H)  Continue residential treatment, currently ~ 30 days of sobriety    Options for treatment and follow-up care were reviewed with the patient. Paul Godfrey and/or guardian engaged in the decision making process and verbalized understanding of the options discussed and agreed with the final plan.    Mario Hawley MD   <<--- Click to launch